# Patient Record
Sex: MALE | Race: WHITE | Employment: OTHER | URBAN - METROPOLITAN AREA
[De-identification: names, ages, dates, MRNs, and addresses within clinical notes are randomized per-mention and may not be internally consistent; named-entity substitution may affect disease eponyms.]

---

## 2017-01-03 RX ORDER — HYDROXYCHLOROQUINE SULFATE 200 MG/1
TABLET, FILM COATED ORAL
Qty: 60 TABLET | Refills: 5 | Status: SHIPPED | OUTPATIENT
Start: 2017-01-03 | End: 2017-07-13 | Stop reason: SDUPTHER

## 2017-01-25 ENCOUNTER — OFFICE VISIT (OUTPATIENT)
Dept: INTERNAL MEDICINE CLINIC | Age: 72
End: 2017-01-25

## 2017-01-25 VITALS
HEIGHT: 69 IN | BODY MASS INDEX: 42.8 KG/M2 | HEART RATE: 64 BPM | WEIGHT: 289 LBS | SYSTOLIC BLOOD PRESSURE: 138 MMHG | DIASTOLIC BLOOD PRESSURE: 80 MMHG

## 2017-01-25 DIAGNOSIS — I10 ESSENTIAL HYPERTENSION: ICD-10-CM

## 2017-01-25 DIAGNOSIS — E11.9 TYPE 2 DIABETES MELLITUS WITHOUT COMPLICATION, WITHOUT LONG-TERM CURRENT USE OF INSULIN (HCC): Primary | ICD-10-CM

## 2017-01-25 LAB — HBA1C MFR BLD: 6 %

## 2017-01-25 PROCEDURE — 99213 OFFICE O/P EST LOW 20 MIN: CPT | Performed by: INTERNAL MEDICINE

## 2017-01-25 PROCEDURE — 83036 HEMOGLOBIN GLYCOSYLATED A1C: CPT | Performed by: INTERNAL MEDICINE

## 2017-01-25 ASSESSMENT — PATIENT HEALTH QUESTIONNAIRE - PHQ9
SUM OF ALL RESPONSES TO PHQ QUESTIONS 1-9: 0
SUM OF ALL RESPONSES TO PHQ9 QUESTIONS 1 & 2: 0
1. LITTLE INTEREST OR PLEASURE IN DOING THINGS: 0
2. FEELING DOWN, DEPRESSED OR HOPELESS: 0

## 2017-01-25 ASSESSMENT — ENCOUNTER SYMPTOMS
WHEEZING: 0
SORE THROAT: 0
VOICE CHANGE: 0
GASTROINTESTINAL NEGATIVE: 1
COUGH: 0
CHEST TIGHTNESS: 0
SHORTNESS OF BREATH: 0

## 2017-03-07 ENCOUNTER — OFFICE VISIT (OUTPATIENT)
Dept: RHEUMATOLOGY | Age: 72
End: 2017-03-07

## 2017-03-07 VITALS
DIASTOLIC BLOOD PRESSURE: 82 MMHG | BODY MASS INDEX: 42.38 KG/M2 | HEART RATE: 67 BPM | WEIGHT: 287 LBS | TEMPERATURE: 98 F | SYSTOLIC BLOOD PRESSURE: 164 MMHG

## 2017-03-07 DIAGNOSIS — Z79.899 HIGH RISK MEDICATION USE: ICD-10-CM

## 2017-03-07 DIAGNOSIS — J84.9 ILD (INTERSTITIAL LUNG DISEASE) (HCC): ICD-10-CM

## 2017-03-07 DIAGNOSIS — M05.79 RHEUMATOID ARTHRITIS INVOLVING MULTIPLE SITES WITH POSITIVE RHEUMATOID FACTOR (HCC): Primary | ICD-10-CM

## 2017-03-07 DIAGNOSIS — M05.79 RHEUMATOID ARTHRITIS INVOLVING MULTIPLE SITES WITH POSITIVE RHEUMATOID FACTOR (HCC): ICD-10-CM

## 2017-03-07 DIAGNOSIS — R76.8 POSITIVE ANA (ANTINUCLEAR ANTIBODY): ICD-10-CM

## 2017-03-07 LAB
A/G RATIO: 1.2 (ref 1.1–2.2)
ALBUMIN SERPL-MCNC: 3.9 G/DL (ref 3.4–5)
ALP BLD-CCNC: 78 U/L (ref 40–129)
ALT SERPL-CCNC: 14 U/L (ref 10–40)
ANION GAP SERPL CALCULATED.3IONS-SCNC: 14 MMOL/L (ref 3–16)
AST SERPL-CCNC: 22 U/L (ref 15–37)
BASOPHILS ABSOLUTE: 0.1 K/UL (ref 0–0.2)
BASOPHILS RELATIVE PERCENT: 1.5 %
BILIRUB SERPL-MCNC: 0.5 MG/DL (ref 0–1)
BUN BLDV-MCNC: 20 MG/DL (ref 7–20)
C-REACTIVE PROTEIN: 6.7 MG/L (ref 0–5.1)
CALCIUM SERPL-MCNC: 10 MG/DL (ref 8.3–10.6)
CHLORIDE BLD-SCNC: 97 MMOL/L (ref 99–110)
CO2: 24 MMOL/L (ref 21–32)
CREAT SERPL-MCNC: 1.5 MG/DL (ref 0.8–1.3)
EOSINOPHILS ABSOLUTE: 0.2 K/UL (ref 0–0.6)
EOSINOPHILS RELATIVE PERCENT: 2.9 %
GFR AFRICAN AMERICAN: 56
GFR NON-AFRICAN AMERICAN: 46
GLOBULIN: 3.3 G/DL
GLUCOSE BLD-MCNC: 148 MG/DL (ref 70–99)
HCT VFR BLD CALC: 45.3 % (ref 40.5–52.5)
HEMOGLOBIN: 15.3 G/DL (ref 13.5–17.5)
LYMPHOCYTES ABSOLUTE: 1.8 K/UL (ref 1–5.1)
LYMPHOCYTES RELATIVE PERCENT: 24 %
MCH RBC QN AUTO: 32.6 PG (ref 26–34)
MCHC RBC AUTO-ENTMCNC: 33.9 G/DL (ref 31–36)
MCV RBC AUTO: 96.2 FL (ref 80–100)
MONOCYTES ABSOLUTE: 0.7 K/UL (ref 0–1.3)
MONOCYTES RELATIVE PERCENT: 10.2 %
NEUTROPHILS ABSOLUTE: 4.5 K/UL (ref 1.7–7.7)
NEUTROPHILS RELATIVE PERCENT: 61.4 %
PDW BLD-RTO: 13.2 % (ref 12.4–15.4)
PLATELET # BLD: 206 K/UL (ref 135–450)
PMV BLD AUTO: 9.9 FL (ref 5–10.5)
POTASSIUM SERPL-SCNC: 4.9 MMOL/L (ref 3.5–5.1)
RBC # BLD: 4.7 M/UL (ref 4.2–5.9)
SEDIMENTATION RATE, ERYTHROCYTE: 30 MM/HR (ref 0–20)
SODIUM BLD-SCNC: 135 MMOL/L (ref 136–145)
TOTAL PROTEIN: 7.2 G/DL (ref 6.4–8.2)
WBC # BLD: 7.3 K/UL (ref 4–11)

## 2017-03-07 PROCEDURE — 99214 OFFICE O/P EST MOD 30 MIN: CPT | Performed by: INTERNAL MEDICINE

## 2017-04-13 RX ORDER — VALSARTAN 160 MG/1
160 TABLET ORAL DAILY
Qty: 90 TABLET | Refills: 1 | Status: SHIPPED | OUTPATIENT
Start: 2017-04-13 | End: 2017-04-17 | Stop reason: SDUPTHER

## 2017-04-17 RX ORDER — VALSARTAN 160 MG/1
160 TABLET ORAL DAILY
Qty: 90 TABLET | Refills: 1 | Status: ON HOLD | OUTPATIENT
Start: 2017-04-17 | End: 2020-01-01

## 2017-04-28 RX ORDER — FENOFIBRATE 145 MG/1
TABLET, COATED ORAL
Qty: 90 TABLET | Refills: 3 | Status: ON HOLD | OUTPATIENT
Start: 2017-04-28 | End: 2020-01-01

## 2017-04-28 RX ORDER — ACEBUTOLOL HYDROCHLORIDE 400 MG/1
400 CAPSULE ORAL 2 TIMES DAILY
Qty: 180 CAPSULE | Refills: 3 | Status: ON HOLD | OUTPATIENT
Start: 2017-04-28 | End: 2020-01-01

## 2017-05-03 ENCOUNTER — OFFICE VISIT (OUTPATIENT)
Dept: INTERNAL MEDICINE CLINIC | Age: 72
End: 2017-05-03

## 2017-05-03 VITALS
OXYGEN SATURATION: 98 % | DIASTOLIC BLOOD PRESSURE: 90 MMHG | HEART RATE: 58 BPM | RESPIRATION RATE: 16 BRPM | BODY MASS INDEX: 42.69 KG/M2 | TEMPERATURE: 98.1 F | WEIGHT: 288.2 LBS | SYSTOLIC BLOOD PRESSURE: 160 MMHG | HEIGHT: 69 IN

## 2017-05-03 DIAGNOSIS — E11.00 TYPE 2 DIABETES MELLITUS WITH HYPEROSMOLARITY WITHOUT COMA, UNSPECIFIED LONG TERM INSULIN USE STATUS: Primary | ICD-10-CM

## 2017-05-03 DIAGNOSIS — I10 ESSENTIAL HYPERTENSION: ICD-10-CM

## 2017-05-03 DIAGNOSIS — E78.5 HYPERLIPIDEMIA, UNSPECIFIED HYPERLIPIDEMIA TYPE: ICD-10-CM

## 2017-05-03 DIAGNOSIS — Z23 NEED FOR VACCINATION WITH 13-POLYVALENT PNEUMOCOCCAL CONJUGATE VACCINE: ICD-10-CM

## 2017-05-03 LAB
CHOLESTEROL, TOTAL: 138 MG/DL (ref 0–199)
HBA1C MFR BLD: 5.9 %
HDLC SERPL-MCNC: 51 MG/DL (ref 40–60)
LDL CHOLESTEROL CALCULATED: 70 MG/DL
TRIGL SERPL-MCNC: 85 MG/DL (ref 0–150)
VLDLC SERPL CALC-MCNC: 17 MG/DL

## 2017-05-03 PROCEDURE — 90670 PCV13 VACCINE IM: CPT | Performed by: INTERNAL MEDICINE

## 2017-05-03 PROCEDURE — 90471 IMMUNIZATION ADMIN: CPT | Performed by: INTERNAL MEDICINE

## 2017-05-03 PROCEDURE — 36415 COLL VENOUS BLD VENIPUNCTURE: CPT | Performed by: INTERNAL MEDICINE

## 2017-05-03 PROCEDURE — 99213 OFFICE O/P EST LOW 20 MIN: CPT | Performed by: INTERNAL MEDICINE

## 2017-05-03 PROCEDURE — 83036 HEMOGLOBIN GLYCOSYLATED A1C: CPT | Performed by: INTERNAL MEDICINE

## 2017-05-03 ASSESSMENT — ENCOUNTER SYMPTOMS
COUGH: 0
GASTROINTESTINAL NEGATIVE: 1
SORE THROAT: 0
TROUBLE SWALLOWING: 0
WHEEZING: 0
SHORTNESS OF BREATH: 1
CHEST TIGHTNESS: 0

## 2017-05-25 RX ORDER — GLYBURIDE 5 MG/1
TABLET ORAL
Qty: 180 TABLET | Refills: 1 | Status: ON HOLD | OUTPATIENT
Start: 2017-05-25 | End: 2020-01-01

## 2017-06-08 ENCOUNTER — TELEPHONE (OUTPATIENT)
Dept: RHEUMATOLOGY | Age: 72
End: 2017-06-08

## 2017-06-09 ENCOUNTER — TELEPHONE (OUTPATIENT)
Dept: RHEUMATOLOGY | Age: 72
End: 2017-06-09

## 2017-07-13 RX ORDER — HYDROXYCHLOROQUINE SULFATE 200 MG/1
TABLET, FILM COATED ORAL
Qty: 60 TABLET | Refills: 5 | Status: SHIPPED | OUTPATIENT
Start: 2017-07-13

## 2020-01-01 ENCOUNTER — APPOINTMENT (OUTPATIENT)
Dept: GENERAL RADIOLOGY | Age: 75
DRG: 177 | End: 2020-01-01
Payer: COMMERCIAL

## 2020-01-01 ENCOUNTER — HOSPITAL ENCOUNTER (INPATIENT)
Age: 75
LOS: 3 days | DRG: 177 | End: 2020-10-21
Attending: EMERGENCY MEDICINE | Admitting: INTERNAL MEDICINE
Payer: COMMERCIAL

## 2020-01-01 ENCOUNTER — APPOINTMENT (OUTPATIENT)
Dept: CT IMAGING | Age: 75
DRG: 177 | End: 2020-01-01
Payer: COMMERCIAL

## 2020-01-01 VITALS
HEART RATE: 103 BPM | OXYGEN SATURATION: 78 % | BODY MASS INDEX: 37.99 KG/M2 | TEMPERATURE: 96.5 F | SYSTOLIC BLOOD PRESSURE: 102 MMHG | RESPIRATION RATE: 36 BRPM | HEIGHT: 68 IN | WEIGHT: 250.66 LBS | DIASTOLIC BLOOD PRESSURE: 79 MMHG

## 2020-01-01 LAB
A/G RATIO: 0.9 (ref 1.1–2.2)
A/G RATIO: 0.9 (ref 1.1–2.2)
A/G RATIO: 1.1 (ref 1.1–2.2)
ABO/RH: NORMAL
ALBUMIN SERPL-MCNC: 2.3 G/DL (ref 3.4–5)
ALBUMIN SERPL-MCNC: 2.8 G/DL (ref 3.4–5)
ALBUMIN SERPL-MCNC: 2.9 G/DL (ref 3.4–5)
ALP BLD-CCNC: 127 U/L (ref 40–129)
ALP BLD-CCNC: 57 U/L (ref 40–129)
ALP BLD-CCNC: 63 U/L (ref 40–129)
ALT SERPL-CCNC: 14 U/L (ref 10–40)
ALT SERPL-CCNC: 15 U/L (ref 10–40)
ALT SERPL-CCNC: 460 U/L (ref 10–40)
ANION GAP SERPL CALCULATED.3IONS-SCNC: 12 MMOL/L (ref 3–16)
ANION GAP SERPL CALCULATED.3IONS-SCNC: 15 MMOL/L (ref 3–16)
ANION GAP SERPL CALCULATED.3IONS-SCNC: 16 MMOL/L (ref 3–16)
ANTIBODY SCREEN: NORMAL
AST SERPL-CCNC: 25 U/L (ref 15–37)
AST SERPL-CCNC: 29 U/L (ref 15–37)
AST SERPL-CCNC: 863 U/L (ref 15–37)
BASE EXCESS ARTERIAL: 3.8 MMOL/L (ref -3–3)
BASOPHILS ABSOLUTE: 0 K/UL (ref 0–0.2)
BASOPHILS ABSOLUTE: 0 K/UL (ref 0–0.2)
BASOPHILS RELATIVE PERCENT: 0 %
BASOPHILS RELATIVE PERCENT: 0.2 %
BILIRUB SERPL-MCNC: 0.4 MG/DL (ref 0–1)
BILIRUB SERPL-MCNC: 0.4 MG/DL (ref 0–1)
BILIRUB SERPL-MCNC: 0.7 MG/DL (ref 0–1)
BLOOD BANK DISPENSE STATUS: NORMAL
BLOOD BANK DISPENSE STATUS: NORMAL
BLOOD BANK PRODUCT CODE: NORMAL
BLOOD BANK PRODUCT CODE: NORMAL
BPU ID: NORMAL
BPU ID: NORMAL
BUN BLDV-MCNC: 18 MG/DL (ref 7–20)
BUN BLDV-MCNC: 21 MG/DL (ref 7–20)
BUN BLDV-MCNC: 37 MG/DL (ref 7–20)
CALCIUM SERPL-MCNC: 7.9 MG/DL (ref 8.3–10.6)
CALCIUM SERPL-MCNC: 8.4 MG/DL (ref 8.3–10.6)
CALCIUM SERPL-MCNC: 8.7 MG/DL (ref 8.3–10.6)
CARBOXYHEMOGLOBIN ARTERIAL: 1.2 % (ref 0–1.5)
CHLORIDE BLD-SCNC: 92 MMOL/L (ref 99–110)
CHLORIDE BLD-SCNC: 95 MMOL/L (ref 99–110)
CHLORIDE BLD-SCNC: 96 MMOL/L (ref 99–110)
CO2: 23 MMOL/L (ref 21–32)
CO2: 24 MMOL/L (ref 21–32)
CO2: 27 MMOL/L (ref 21–32)
CREAT SERPL-MCNC: 0.9 MG/DL (ref 0.8–1.3)
CREAT SERPL-MCNC: 1 MG/DL (ref 0.8–1.3)
CREAT SERPL-MCNC: 1.3 MG/DL (ref 0.8–1.3)
D DIMER: 1944 NG/ML DDU (ref 0–229)
DESCRIPTION BLOOD BANK: NORMAL
DESCRIPTION BLOOD BANK: NORMAL
EKG ATRIAL RATE: 97 BPM
EKG DIAGNOSIS: NORMAL
EKG P AXIS: 40 DEGREES
EKG P-R INTERVAL: 166 MS
EKG Q-T INTERVAL: 338 MS
EKG QRS DURATION: 98 MS
EKG QTC CALCULATION (BAZETT): 429 MS
EKG R AXIS: 45 DEGREES
EKG T AXIS: 104 DEGREES
EKG VENTRICULAR RATE: 97 BPM
EOSINOPHILS ABSOLUTE: 0 K/UL (ref 0–0.6)
EOSINOPHILS ABSOLUTE: 0 K/UL (ref 0–0.6)
EOSINOPHILS RELATIVE PERCENT: 0 %
EOSINOPHILS RELATIVE PERCENT: 0 %
ESTIMATED AVERAGE GLUCOSE: 174.3 MG/DL
GFR AFRICAN AMERICAN: >60
GFR NON-AFRICAN AMERICAN: 54
GFR NON-AFRICAN AMERICAN: >60
GFR NON-AFRICAN AMERICAN: >60
GLOBULIN: 2.6 G/DL
GLOBULIN: 2.7 G/DL
GLOBULIN: 3 G/DL
GLUCOSE BLD-MCNC: 117 MG/DL (ref 70–99)
GLUCOSE BLD-MCNC: 144 MG/DL (ref 70–99)
GLUCOSE BLD-MCNC: 153 MG/DL (ref 70–99)
GLUCOSE BLD-MCNC: 164 MG/DL (ref 70–99)
GLUCOSE BLD-MCNC: 183 MG/DL (ref 70–99)
GLUCOSE BLD-MCNC: 186 MG/DL (ref 70–99)
GLUCOSE BLD-MCNC: 188 MG/DL (ref 70–99)
GLUCOSE BLD-MCNC: 199 MG/DL (ref 70–99)
GLUCOSE BLD-MCNC: 203 MG/DL (ref 70–99)
GLUCOSE BLD-MCNC: 218 MG/DL (ref 70–99)
GLUCOSE BLD-MCNC: 225 MG/DL (ref 70–99)
GLUCOSE BLD-MCNC: 229 MG/DL (ref 70–99)
GLUCOSE BLD-MCNC: 244 MG/DL (ref 70–99)
GLUCOSE BLD-MCNC: 261 MG/DL (ref 70–99)
GLUCOSE BLD-MCNC: 278 MG/DL (ref 70–99)
GLUCOSE BLD-MCNC: 79 MG/DL (ref 70–99)
HBA1C MFR BLD: 7.7 %
HCO3 ARTERIAL: 27.8 MMOL/L (ref 21–29)
HCT VFR BLD CALC: 36.6 % (ref 40.5–52.5)
HCT VFR BLD CALC: 37.8 % (ref 40.5–52.5)
HCT VFR BLD CALC: 39.4 % (ref 40.5–52.5)
HEMOGLOBIN, ART, EXTENDED: 12.9 G/DL (ref 13.5–17.5)
HEMOGLOBIN: 11.9 G/DL (ref 13.5–17.5)
HEMOGLOBIN: 12.7 G/DL (ref 13.5–17.5)
HEMOGLOBIN: 12.9 G/DL (ref 13.5–17.5)
LACTIC ACID: 1.7 MMOL/L (ref 0.4–2)
LACTIC ACID: 1.8 MMOL/L (ref 0.4–2)
LACTIC ACID: 2.1 MMOL/L (ref 0.4–2)
LACTIC ACID: 2.4 MMOL/L (ref 0.4–2)
LACTIC ACID: 3 MMOL/L (ref 0.4–2)
LACTIC ACID: 3.2 MMOL/L (ref 0.4–2)
LACTIC ACID: 4 MMOL/L (ref 0.4–2)
LACTIC ACID: 4.4 MMOL/L (ref 0.4–2)
LACTIC ACID: 6.1 MMOL/L (ref 0.4–2)
LYMPHOCYTES ABSOLUTE: 0.2 K/UL (ref 1–5.1)
LYMPHOCYTES ABSOLUTE: 0.2 K/UL (ref 1–5.1)
LYMPHOCYTES RELATIVE PERCENT: 2 %
LYMPHOCYTES RELATIVE PERCENT: 3.5 %
MAGNESIUM: 1.4 MG/DL (ref 1.8–2.4)
MAGNESIUM: 1.9 MG/DL (ref 1.8–2.4)
MCH RBC QN AUTO: 30.1 PG (ref 26–34)
MCH RBC QN AUTO: 30.2 PG (ref 26–34)
MCH RBC QN AUTO: 30.6 PG (ref 26–34)
MCHC RBC AUTO-ENTMCNC: 32.6 G/DL (ref 31–36)
MCHC RBC AUTO-ENTMCNC: 32.7 G/DL (ref 31–36)
MCHC RBC AUTO-ENTMCNC: 33.5 G/DL (ref 31–36)
MCV RBC AUTO: 91.4 FL (ref 80–100)
MCV RBC AUTO: 92 FL (ref 80–100)
MCV RBC AUTO: 92.7 FL (ref 80–100)
METHEMOGLOBIN ARTERIAL: 0.1 %
MONOCYTES ABSOLUTE: 0.6 K/UL (ref 0–1.3)
MONOCYTES ABSOLUTE: 0.6 K/UL (ref 0–1.3)
MONOCYTES RELATIVE PERCENT: 5.6 %
MONOCYTES RELATIVE PERCENT: 8 %
NEUTROPHILS ABSOLUTE: 6.2 K/UL (ref 1.7–7.7)
NEUTROPHILS ABSOLUTE: 9.3 K/UL (ref 1.7–7.7)
NEUTROPHILS RELATIVE PERCENT: 88.3 %
NEUTROPHILS RELATIVE PERCENT: 92.4 %
O2 CONTENT ARTERIAL: 15 ML/DL
O2 SAT, ARTERIAL: 84.9 %
O2 THERAPY: ABNORMAL
PCO2 ARTERIAL: 38.9 MMHG (ref 35–45)
PDW BLD-RTO: 14.6 % (ref 12.4–15.4)
PDW BLD-RTO: 14.6 % (ref 12.4–15.4)
PDW BLD-RTO: 14.8 % (ref 12.4–15.4)
PERFORMED ON: ABNORMAL
PH ARTERIAL: 7.46 (ref 7.35–7.45)
PHOSPHORUS: 3.8 MG/DL (ref 2.5–4.9)
PLATELET # BLD: 158 K/UL (ref 135–450)
PLATELET # BLD: 196 K/UL (ref 135–450)
PLATELET # BLD: 207 K/UL (ref 135–450)
PMV BLD AUTO: 8.6 FL (ref 5–10.5)
PMV BLD AUTO: 9 FL (ref 5–10.5)
PMV BLD AUTO: 9.4 FL (ref 5–10.5)
PO2 ARTERIAL: 48.3 MMHG (ref 75–108)
POTASSIUM REFLEX MAGNESIUM: 4.1 MMOL/L (ref 3.5–5.1)
POTASSIUM REFLEX MAGNESIUM: 4.2 MMOL/L (ref 3.5–5.1)
POTASSIUM SERPL-SCNC: 3.9 MMOL/L (ref 3.5–5.1)
PROCALCITONIN: 0.13 NG/ML (ref 0–0.15)
PROCALCITONIN: 0.17 NG/ML (ref 0–0.15)
RBC # BLD: 3.94 M/UL (ref 4.2–5.9)
RBC # BLD: 4.14 M/UL (ref 4.2–5.9)
RBC # BLD: 4.29 M/UL (ref 4.2–5.9)
SARS-COV-2, NAAT: DETECTED
SODIUM BLD-SCNC: 132 MMOL/L (ref 136–145)
SODIUM BLD-SCNC: 133 MMOL/L (ref 136–145)
SODIUM BLD-SCNC: 135 MMOL/L (ref 136–145)
TCO2 ARTERIAL: 29 MMOL/L
TOTAL PROTEIN: 5 G/DL (ref 6.4–8.2)
TOTAL PROTEIN: 5.5 G/DL (ref 6.4–8.2)
TOTAL PROTEIN: 5.8 G/DL (ref 6.4–8.2)
TROPONIN: 0.02 NG/ML
WBC # BLD: 10.1 K/UL (ref 4–11)
WBC # BLD: 2.9 K/UL (ref 4–11)
WBC # BLD: 7 K/UL (ref 4–11)

## 2020-01-01 PROCEDURE — 2580000003 HC RX 258: Performed by: INTERNAL MEDICINE

## 2020-01-01 PROCEDURE — 6360000002 HC RX W HCPCS: Performed by: INTERNAL MEDICINE

## 2020-01-01 PROCEDURE — 80053 COMPREHEN METABOLIC PANEL: CPT

## 2020-01-01 PROCEDURE — 94640 AIRWAY INHALATION TREATMENT: CPT

## 2020-01-01 PROCEDURE — 6370000000 HC RX 637 (ALT 250 FOR IP): Performed by: INTERNAL MEDICINE

## 2020-01-01 PROCEDURE — 36600 WITHDRAWAL OF ARTERIAL BLOOD: CPT

## 2020-01-01 PROCEDURE — 99291 CRITICAL CARE FIRST HOUR: CPT | Performed by: INTERNAL MEDICINE

## 2020-01-01 PROCEDURE — 83735 ASSAY OF MAGNESIUM: CPT

## 2020-01-01 PROCEDURE — 2500000003 HC RX 250 WO HCPCS: Performed by: INTERNAL MEDICINE

## 2020-01-01 PROCEDURE — 36415 COLL VENOUS BLD VENIPUNCTURE: CPT

## 2020-01-01 PROCEDURE — 85379 FIBRIN DEGRADATION QUANT: CPT

## 2020-01-01 PROCEDURE — 71045 X-RAY EXAM CHEST 1 VIEW: CPT

## 2020-01-01 PROCEDURE — 86850 RBC ANTIBODY SCREEN: CPT

## 2020-01-01 PROCEDURE — 84145 PROCALCITONIN (PCT): CPT

## 2020-01-01 PROCEDURE — 6370000000 HC RX 637 (ALT 250 FOR IP): Performed by: NURSE PRACTITIONER

## 2020-01-01 PROCEDURE — 85025 COMPLETE CBC W/AUTO DIFF WBC: CPT

## 2020-01-01 PROCEDURE — 84100 ASSAY OF PHOSPHORUS: CPT

## 2020-01-01 PROCEDURE — 94660 CPAP INITIATION&MGMT: CPT

## 2020-01-01 PROCEDURE — 6360000002 HC RX W HCPCS: Performed by: NURSE PRACTITIONER

## 2020-01-01 PROCEDURE — 83605 ASSAY OF LACTIC ACID: CPT

## 2020-01-01 PROCEDURE — 86900 BLOOD TYPING SEROLOGIC ABO: CPT

## 2020-01-01 PROCEDURE — 83036 HEMOGLOBIN GLYCOSYLATED A1C: CPT

## 2020-01-01 PROCEDURE — 87040 BLOOD CULTURE FOR BACTERIA: CPT

## 2020-01-01 PROCEDURE — 85027 COMPLETE CBC AUTOMATED: CPT

## 2020-01-01 PROCEDURE — 84484 ASSAY OF TROPONIN QUANT: CPT

## 2020-01-01 PROCEDURE — 86901 BLOOD TYPING SEROLOGIC RH(D): CPT

## 2020-01-01 PROCEDURE — APPNB15 APP NON BILLABLE TIME 0-15 MINS: Performed by: NURSE PRACTITIONER

## 2020-01-01 PROCEDURE — 94761 N-INVAS EAR/PLS OXIMETRY MLT: CPT

## 2020-01-01 PROCEDURE — U0002 COVID-19 LAB TEST NON-CDC: HCPCS

## 2020-01-01 PROCEDURE — 2580000003 HC RX 258: Performed by: EMERGENCY MEDICINE

## 2020-01-01 PROCEDURE — XW033E5 INTRODUCTION OF REMDESIVIR ANTI-INFECTIVE INTO PERIPHERAL VEIN, PERCUTANEOUS APPROACH, NEW TECHNOLOGY GROUP 5: ICD-10-PCS | Performed by: INTERNAL MEDICINE

## 2020-01-01 PROCEDURE — 2700000000 HC OXYGEN THERAPY PER DAY

## 2020-01-01 PROCEDURE — 2000000000 HC ICU R&B

## 2020-01-01 PROCEDURE — 6360000002 HC RX W HCPCS: Performed by: EMERGENCY MEDICINE

## 2020-01-01 PROCEDURE — 93010 ELECTROCARDIOGRAM REPORT: CPT | Performed by: INTERNAL MEDICINE

## 2020-01-01 PROCEDURE — 2580000003 HC RX 258: Performed by: NURSE PRACTITIONER

## 2020-01-01 PROCEDURE — XW13325 TRANSFUSION OF CONVALESCENT PLASMA (NONAUTOLOGOUS) INTO PERIPHERAL VEIN, PERCUTANEOUS APPROACH, NEW TECHNOLOGY GROUP 5: ICD-10-PCS | Performed by: INTERNAL MEDICINE

## 2020-01-01 PROCEDURE — 99285 EMERGENCY DEPT VISIT HI MDM: CPT

## 2020-01-01 PROCEDURE — 82803 BLOOD GASES ANY COMBINATION: CPT

## 2020-01-01 PROCEDURE — 93005 ELECTROCARDIOGRAM TRACING: CPT | Performed by: EMERGENCY MEDICINE

## 2020-01-01 RX ORDER — ATORVASTATIN CALCIUM 40 MG/1
40 TABLET, FILM COATED ORAL NIGHTLY
COMMUNITY

## 2020-01-01 RX ORDER — MAGNESIUM SULFATE IN WATER 40 MG/ML
2 INJECTION, SOLUTION INTRAVENOUS ONCE
Status: COMPLETED | OUTPATIENT
Start: 2020-01-01 | End: 2020-01-01

## 2020-01-01 RX ORDER — METOPROLOL SUCCINATE 100 MG/1
100 TABLET, EXTENDED RELEASE ORAL DAILY
COMMUNITY

## 2020-01-01 RX ORDER — MYCOPHENOLATE MOFETIL 250 MG/1
1000 CAPSULE ORAL 2 TIMES DAILY
COMMUNITY

## 2020-01-01 RX ORDER — SODIUM CHLORIDE 0.9 % (FLUSH) 0.9 %
10 SYRINGE (ML) INJECTION EVERY 12 HOURS SCHEDULED
Status: DISCONTINUED | OUTPATIENT
Start: 2020-01-01 | End: 2020-01-01 | Stop reason: SDUPTHER

## 2020-01-01 RX ORDER — DEXTROSE MONOHYDRATE 50 MG/ML
100 INJECTION, SOLUTION INTRAVENOUS PRN
Status: DISCONTINUED | OUTPATIENT
Start: 2020-01-01 | End: 2020-01-01 | Stop reason: HOSPADM

## 2020-01-01 RX ORDER — VALSARTAN 80 MG/1
160 TABLET ORAL DAILY
Status: DISCONTINUED | OUTPATIENT
Start: 2020-01-01 | End: 2020-01-01 | Stop reason: ALTCHOICE

## 2020-01-01 RX ORDER — MAGNESIUM SULFATE IN WATER 40 MG/ML
2 INJECTION, SOLUTION INTRAVENOUS PRN
Status: DISCONTINUED | OUTPATIENT
Start: 2020-01-01 | End: 2020-01-01 | Stop reason: HOSPADM

## 2020-01-01 RX ORDER — MORPHINE SULFATE 2 MG/ML
2 INJECTION, SOLUTION INTRAMUSCULAR; INTRAVENOUS EVERY 4 HOURS PRN
Status: DISCONTINUED | OUTPATIENT
Start: 2020-01-01 | End: 2020-01-01 | Stop reason: HOSPADM

## 2020-01-01 RX ORDER — LISINOPRIL 10 MG/1
10 TABLET ORAL DAILY
COMMUNITY

## 2020-01-01 RX ORDER — SODIUM CHLORIDE 0.9 % (FLUSH) 0.9 %
10 SYRINGE (ML) INJECTION PRN
Status: DISCONTINUED | OUTPATIENT
Start: 2020-01-01 | End: 2020-01-01

## 2020-01-01 RX ORDER — AZITHROMYCIN 500 MG/1
500 TABLET, FILM COATED ORAL ONCE
Status: DISCONTINUED | OUTPATIENT
Start: 2020-01-01 | End: 2020-01-01

## 2020-01-01 RX ORDER — MYCOPHENOLATE MOFETIL 250 MG/1
1000 CAPSULE ORAL 2 TIMES DAILY
Status: DISCONTINUED | OUTPATIENT
Start: 2020-01-01 | End: 2020-01-01 | Stop reason: HOSPADM

## 2020-01-01 RX ORDER — SODIUM CHLORIDE 0.9 % (FLUSH) 0.9 %
10 SYRINGE (ML) INJECTION EVERY 12 HOURS SCHEDULED
Status: DISCONTINUED | OUTPATIENT
Start: 2020-01-01 | End: 2020-01-01

## 2020-01-01 RX ORDER — LISINOPRIL 10 MG/1
10 TABLET ORAL DAILY
Status: DISCONTINUED | OUTPATIENT
Start: 2020-01-01 | End: 2020-01-01 | Stop reason: HOSPADM

## 2020-01-01 RX ORDER — POLYETHYLENE GLYCOL 3350 17 G/17G
17 POWDER, FOR SOLUTION ORAL DAILY PRN
Status: DISCONTINUED | OUTPATIENT
Start: 2020-01-01 | End: 2020-01-01 | Stop reason: HOSPADM

## 2020-01-01 RX ORDER — ATORVASTATIN CALCIUM 40 MG/1
40 TABLET, FILM COATED ORAL NIGHTLY
Status: DISCONTINUED | OUTPATIENT
Start: 2020-01-01 | End: 2020-01-01 | Stop reason: HOSPADM

## 2020-01-01 RX ORDER — GLIPIZIDE 5 MG/1
5 TABLET ORAL
COMMUNITY

## 2020-01-01 RX ORDER — 0.9 % SODIUM CHLORIDE 0.9 %
1000 INTRAVENOUS SOLUTION INTRAVENOUS ONCE
Status: COMPLETED | OUTPATIENT
Start: 2020-01-01 | End: 2020-01-01

## 2020-01-01 RX ORDER — 0.9 % SODIUM CHLORIDE 0.9 %
20 INTRAVENOUS SOLUTION INTRAVENOUS ONCE
Status: COMPLETED | OUTPATIENT
Start: 2020-01-01 | End: 2020-01-01

## 2020-01-01 RX ORDER — FUROSEMIDE 10 MG/ML
20 INJECTION INTRAMUSCULAR; INTRAVENOUS ONCE
Status: COMPLETED | OUTPATIENT
Start: 2020-01-01 | End: 2020-01-01

## 2020-01-01 RX ORDER — DEXAMETHASONE SODIUM PHOSPHATE 4 MG/ML
10 INJECTION, SOLUTION INTRA-ARTICULAR; INTRALESIONAL; INTRAMUSCULAR; INTRAVENOUS; SOFT TISSUE ONCE
Status: DISCONTINUED | OUTPATIENT
Start: 2020-01-01 | End: 2020-01-01

## 2020-01-01 RX ORDER — FUROSEMIDE 40 MG/1
40 TABLET ORAL EVERY MORNING
COMMUNITY

## 2020-01-01 RX ORDER — PREDNISONE 10 MG/1
15 TABLET ORAL DAILY
COMMUNITY

## 2020-01-01 RX ORDER — DEXAMETHASONE SODIUM PHOSPHATE 10 MG/ML
6 INJECTION, SOLUTION INTRAMUSCULAR; INTRAVENOUS EVERY 24 HOURS
Status: DISCONTINUED | OUTPATIENT
Start: 2020-01-01 | End: 2020-01-01

## 2020-01-01 RX ORDER — ALBUTEROL SULFATE 2.5 MG/3ML
2.5 SOLUTION RESPIRATORY (INHALATION) EVERY 4 HOURS
Status: DISCONTINUED | OUTPATIENT
Start: 2020-01-01 | End: 2020-01-01 | Stop reason: CLARIF

## 2020-01-01 RX ORDER — SODIUM CHLORIDE 0.9 % (FLUSH) 0.9 %
10 SYRINGE (ML) INJECTION PRN
Status: DISCONTINUED | OUTPATIENT
Start: 2020-01-01 | End: 2020-01-01 | Stop reason: SDUPTHER

## 2020-01-01 RX ORDER — SODIUM CHLORIDE 9 MG/ML
INJECTION, SOLUTION INTRAVENOUS CONTINUOUS
Status: DISCONTINUED | OUTPATIENT
Start: 2020-01-01 | End: 2020-01-01 | Stop reason: HOSPADM

## 2020-01-01 RX ORDER — FENOFIBRATE 54 MG/1
54 TABLET ORAL DAILY
Status: DISCONTINUED | OUTPATIENT
Start: 2020-01-01 | End: 2020-01-01 | Stop reason: ALTCHOICE

## 2020-01-01 RX ORDER — ONDANSETRON 2 MG/ML
4 INJECTION INTRAMUSCULAR; INTRAVENOUS EVERY 6 HOURS PRN
Status: DISCONTINUED | OUTPATIENT
Start: 2020-01-01 | End: 2020-01-01 | Stop reason: HOSPADM

## 2020-01-01 RX ORDER — PROMETHAZINE HYDROCHLORIDE 25 MG/1
12.5 TABLET ORAL EVERY 6 HOURS PRN
Status: DISCONTINUED | OUTPATIENT
Start: 2020-01-01 | End: 2020-01-01 | Stop reason: HOSPADM

## 2020-01-01 RX ORDER — ALBUTEROL SULFATE 90 UG/1
2 AEROSOL, METERED RESPIRATORY (INHALATION) 4 TIMES DAILY
Status: DISCONTINUED | OUTPATIENT
Start: 2020-01-01 | End: 2020-01-01 | Stop reason: HOSPADM

## 2020-01-01 RX ORDER — DEXAMETHASONE SODIUM PHOSPHATE 4 MG/ML
10 INJECTION, SOLUTION INTRA-ARTICULAR; INTRALESIONAL; INTRAMUSCULAR; INTRAVENOUS; SOFT TISSUE ONCE
Status: COMPLETED | OUTPATIENT
Start: 2020-01-01 | End: 2020-01-01

## 2020-01-01 RX ORDER — POTASSIUM CHLORIDE 7.45 MG/ML
10 INJECTION INTRAVENOUS PRN
Status: DISCONTINUED | OUTPATIENT
Start: 2020-01-01 | End: 2020-01-01 | Stop reason: HOSPADM

## 2020-01-01 RX ORDER — FUROSEMIDE 20 MG/1
20 TABLET ORAL EVERY EVENING
COMMUNITY

## 2020-01-01 RX ORDER — DEXTROSE MONOHYDRATE 25 G/50ML
12.5 INJECTION, SOLUTION INTRAVENOUS PRN
Status: DISCONTINUED | OUTPATIENT
Start: 2020-01-01 | End: 2020-01-01 | Stop reason: HOSPADM

## 2020-01-01 RX ORDER — LORAZEPAM 0.5 MG/1
0.5 TABLET ORAL EVERY 6 HOURS PRN
Status: DISCONTINUED | OUTPATIENT
Start: 2020-01-01 | End: 2020-01-01 | Stop reason: HOSPADM

## 2020-01-01 RX ORDER — ACETAMINOPHEN 650 MG/1
650 SUPPOSITORY RECTAL EVERY 6 HOURS PRN
Status: DISCONTINUED | OUTPATIENT
Start: 2020-01-01 | End: 2020-01-01 | Stop reason: HOSPADM

## 2020-01-01 RX ORDER — NICOTINE POLACRILEX 4 MG
15 LOZENGE BUCCAL PRN
Status: DISCONTINUED | OUTPATIENT
Start: 2020-01-01 | End: 2020-01-01 | Stop reason: HOSPADM

## 2020-01-01 RX ORDER — DEXAMETHASONE SODIUM PHOSPHATE 4 MG/ML
6 INJECTION, SOLUTION INTRA-ARTICULAR; INTRALESIONAL; INTRAMUSCULAR; INTRAVENOUS; SOFT TISSUE DAILY
Status: DISCONTINUED | OUTPATIENT
Start: 2020-01-01 | End: 2020-01-01

## 2020-01-01 RX ORDER — LIDOCAINE HYDROCHLORIDE 10 MG/ML
5 INJECTION, SOLUTION EPIDURAL; INFILTRATION; INTRACAUDAL; PERINEURAL ONCE
Status: DISCONTINUED | OUTPATIENT
Start: 2020-01-01 | End: 2020-01-01

## 2020-01-01 RX ORDER — ACETAMINOPHEN 325 MG/1
650 TABLET ORAL EVERY 6 HOURS PRN
Status: DISCONTINUED | OUTPATIENT
Start: 2020-01-01 | End: 2020-01-01 | Stop reason: HOSPADM

## 2020-01-01 RX ORDER — HYDROXYCHLOROQUINE SULFATE 200 MG/1
200 TABLET, FILM COATED ORAL 2 TIMES DAILY
Status: DISCONTINUED | OUTPATIENT
Start: 2020-01-01 | End: 2020-01-01 | Stop reason: HOSPADM

## 2020-01-01 RX ADMIN — REMDESIVIR 200 MG: 100 INJECTION, POWDER, LYOPHILIZED, FOR SOLUTION INTRAVENOUS at 14:10

## 2020-01-01 RX ADMIN — MORPHINE SULFATE 2 MG: 2 INJECTION, SOLUTION INTRAMUSCULAR; INTRAVENOUS at 02:41

## 2020-01-01 RX ADMIN — MORPHINE SULFATE 2 MG: 2 INJECTION, SOLUTION INTRAMUSCULAR; INTRAVENOUS at 06:06

## 2020-01-01 RX ADMIN — ALBUTEROL SULFATE 2 PUFF: 108 AEROSOL, METERED RESPIRATORY (INHALATION) at 07:35

## 2020-01-01 RX ADMIN — ONDANSETRON 4 MG: 2 INJECTION INTRAMUSCULAR; INTRAVENOUS at 20:02

## 2020-01-01 RX ADMIN — ALBUTEROL SULFATE 2 PUFF: 108 AEROSOL, METERED RESPIRATORY (INHALATION) at 15:42

## 2020-01-01 RX ADMIN — INSULIN LISPRO 6 UNITS: 100 INJECTION, SOLUTION INTRAVENOUS; SUBCUTANEOUS at 23:17

## 2020-01-01 RX ADMIN — MORPHINE SULFATE 2 MG: 2 INJECTION, SOLUTION INTRAMUSCULAR; INTRAVENOUS at 17:47

## 2020-01-01 RX ADMIN — SODIUM CHLORIDE 1000 ML: 9 INJECTION, SOLUTION INTRAVENOUS at 16:46

## 2020-01-01 RX ADMIN — Medication 6 MG: at 21:19

## 2020-01-01 RX ADMIN — ALBUTEROL SULFATE 2 PUFF: 108 AEROSOL, METERED RESPIRATORY (INHALATION) at 11:48

## 2020-01-01 RX ADMIN — ALBUTEROL SULFATE 2 PUFF: 108 AEROSOL, METERED RESPIRATORY (INHALATION) at 19:32

## 2020-01-01 RX ADMIN — MORPHINE SULFATE 2 MG: 2 INJECTION, SOLUTION INTRAMUSCULAR; INTRAVENOUS at 10:48

## 2020-01-01 RX ADMIN — ATORVASTATIN CALCIUM 40 MG: 40 TABLET, FILM COATED ORAL at 21:48

## 2020-01-01 RX ADMIN — MYCOPHENOLATE MOFETIL 1000 MG: 250 CAPSULE ORAL at 21:49

## 2020-01-01 RX ADMIN — HYDROXYCHLOROQUINE SULFATE 200 MG: 200 TABLET ORAL at 09:10

## 2020-01-01 RX ADMIN — INSULIN LISPRO 3 UNITS: 100 INJECTION, SOLUTION INTRAVENOUS; SUBCUTANEOUS at 09:15

## 2020-01-01 RX ADMIN — LORAZEPAM 0.5 MG: 0.5 TABLET ORAL at 20:02

## 2020-01-01 RX ADMIN — INSULIN LISPRO 9 UNITS: 100 INJECTION, SOLUTION INTRAVENOUS; SUBCUTANEOUS at 12:42

## 2020-01-01 RX ADMIN — PROMETHAZINE HYDROCHLORIDE 12.5 MG: 25 TABLET ORAL at 17:40

## 2020-01-01 RX ADMIN — Medication 10 ML: at 09:43

## 2020-01-01 RX ADMIN — Medication 4 MCG/MIN: at 18:40

## 2020-01-01 RX ADMIN — HYDROXYCHLOROQUINE SULFATE 200 MG: 200 TABLET ORAL at 20:02

## 2020-01-01 RX ADMIN — AZITHROMYCIN MONOHYDRATE 500 MG: 500 INJECTION, POWDER, LYOPHILIZED, FOR SOLUTION INTRAVENOUS at 17:28

## 2020-01-01 RX ADMIN — ALBUTEROL SULFATE 2.5 MG: 2.5 SOLUTION RESPIRATORY (INHALATION) at 00:11

## 2020-01-01 RX ADMIN — Medication 10 ML: at 21:27

## 2020-01-01 RX ADMIN — LORAZEPAM 0.5 MG: 0.5 TABLET ORAL at 13:20

## 2020-01-01 RX ADMIN — INSULIN LISPRO 6 UNITS: 100 INJECTION, SOLUTION INTRAVENOUS; SUBCUTANEOUS at 19:49

## 2020-01-01 RX ADMIN — CALCIUM GLUCONATE 2 G: 98 INJECTION, SOLUTION INTRAVENOUS at 11:00

## 2020-01-01 RX ADMIN — DEXAMETHASONE SODIUM PHOSPHATE 10 MG: 4 INJECTION, SOLUTION INTRAMUSCULAR; INTRAVENOUS at 17:20

## 2020-01-01 RX ADMIN — MYCOPHENOLATE MOFETIL 1000 MG: 250 CAPSULE ORAL at 09:13

## 2020-01-01 RX ADMIN — MORPHINE SULFATE 2 MG: 2 INJECTION, SOLUTION INTRAMUSCULAR; INTRAVENOUS at 17:42

## 2020-01-01 RX ADMIN — MAGNESIUM SULFATE HEPTAHYDRATE 2 G: 40 INJECTION, SOLUTION INTRAVENOUS at 11:40

## 2020-01-01 RX ADMIN — FUROSEMIDE 20 MG: 10 INJECTION, SOLUTION INTRAMUSCULAR; INTRAVENOUS at 21:18

## 2020-01-01 RX ADMIN — HYDROXYCHLOROQUINE SULFATE 200 MG: 200 TABLET ORAL at 21:18

## 2020-01-01 RX ADMIN — ENOXAPARIN SODIUM 30 MG: 30 INJECTION SUBCUTANEOUS at 09:43

## 2020-01-01 RX ADMIN — ALBUTEROL SULFATE 2 PUFF: 108 AEROSOL, METERED RESPIRATORY (INHALATION) at 19:50

## 2020-01-01 RX ADMIN — ENOXAPARIN SODIUM 60 MG: 60 INJECTION SUBCUTANEOUS at 21:48

## 2020-01-01 RX ADMIN — INSULIN LISPRO 3 UNITS: 100 INJECTION, SOLUTION INTRAVENOUS; SUBCUTANEOUS at 20:00

## 2020-01-01 RX ADMIN — MORPHINE SULFATE 2 MG: 2 INJECTION, SOLUTION INTRAMUSCULAR; INTRAVENOUS at 21:49

## 2020-01-01 RX ADMIN — INSULIN LISPRO 9 UNITS: 100 INJECTION, SOLUTION INTRAVENOUS; SUBCUTANEOUS at 16:24

## 2020-01-01 RX ADMIN — ALBUTEROL SULFATE 2 PUFF: 108 AEROSOL, METERED RESPIRATORY (INHALATION) at 07:58

## 2020-01-01 RX ADMIN — MYCOPHENOLATE MOFETIL 1000 MG: 250 CAPSULE ORAL at 20:02

## 2020-01-01 RX ADMIN — ENOXAPARIN SODIUM 60 MG: 60 INJECTION SUBCUTANEOUS at 09:11

## 2020-01-01 RX ADMIN — ALBUTEROL SULFATE 2 PUFF: 108 AEROSOL, METERED RESPIRATORY (INHALATION) at 11:15

## 2020-01-01 RX ADMIN — HYDROXYCHLOROQUINE SULFATE 200 MG: 200 TABLET ORAL at 21:48

## 2020-01-01 RX ADMIN — DEXAMETHASONE SODIUM PHOSPHATE 20 MG: 4 INJECTION, SOLUTION INTRA-ARTICULAR; INTRALESIONAL; INTRAMUSCULAR; INTRAVENOUS; SOFT TISSUE at 10:37

## 2020-01-01 RX ADMIN — DEXAMETHASONE SODIUM PHOSPHATE 20 MG: 4 INJECTION, SOLUTION INTRA-ARTICULAR; INTRALESIONAL; INTRAMUSCULAR; INTRAVENOUS; SOFT TISSUE at 09:44

## 2020-01-01 RX ADMIN — LORAZEPAM 0.5 MG: 0.5 TABLET ORAL at 09:10

## 2020-01-01 RX ADMIN — ALBUTEROL SULFATE 2.5 MG: 2.5 SOLUTION RESPIRATORY (INHALATION) at 19:52

## 2020-01-01 RX ADMIN — INSULIN LISPRO 6 UNITS: 100 INJECTION, SOLUTION INTRAVENOUS; SUBCUTANEOUS at 00:07

## 2020-01-01 RX ADMIN — LORAZEPAM 0.5 MG: 0.5 TABLET ORAL at 02:38

## 2020-01-01 RX ADMIN — MYCOPHENOLATE MOFETIL 1000 MG: 250 CAPSULE ORAL at 13:20

## 2020-01-01 RX ADMIN — MORPHINE SULFATE 2 MG: 2 INJECTION, SOLUTION INTRAMUSCULAR; INTRAVENOUS at 02:06

## 2020-01-01 RX ADMIN — ENOXAPARIN SODIUM 30 MG: 30 INJECTION SUBCUTANEOUS at 21:18

## 2020-01-01 RX ADMIN — INSULIN LISPRO 3 UNITS: 100 INJECTION, SOLUTION INTRAVENOUS; SUBCUTANEOUS at 03:46

## 2020-01-01 RX ADMIN — ALBUTEROL SULFATE 2 PUFF: 108 AEROSOL, METERED RESPIRATORY (INHALATION) at 15:56

## 2020-01-01 RX ADMIN — SODIUM CHLORIDE: 9 INJECTION, SOLUTION INTRAVENOUS at 22:26

## 2020-01-01 RX ADMIN — HYDROXYCHLOROQUINE SULFATE 200 MG: 200 TABLET ORAL at 13:20

## 2020-01-01 RX ADMIN — INSULIN LISPRO 3 UNITS: 100 INJECTION, SOLUTION INTRAVENOUS; SUBCUTANEOUS at 16:43

## 2020-01-01 RX ADMIN — ONDANSETRON 4 MG: 2 INJECTION INTRAMUSCULAR; INTRAVENOUS at 14:09

## 2020-01-01 RX ADMIN — INSULIN LISPRO 3 UNITS: 100 INJECTION, SOLUTION INTRAVENOUS; SUBCUTANEOUS at 11:40

## 2020-01-01 RX ADMIN — LISINOPRIL 10 MG: 10 TABLET ORAL at 09:10

## 2020-01-01 RX ADMIN — INSULIN LISPRO 4 UNITS: 100 INJECTION, SOLUTION INTRAVENOUS; SUBCUTANEOUS at 09:44

## 2020-01-01 RX ADMIN — ENOXAPARIN SODIUM 60 MG: 60 INJECTION SUBCUTANEOUS at 20:02

## 2020-01-01 RX ADMIN — LISINOPRIL 10 MG: 10 TABLET ORAL at 13:20

## 2020-01-01 RX ADMIN — SODIUM CHLORIDE 20 ML: 9 INJECTION, SOLUTION INTRAVENOUS at 13:22

## 2020-01-01 ASSESSMENT — PAIN DESCRIPTION - PROGRESSION
CLINICAL_PROGRESSION: GRADUALLY WORSENING
CLINICAL_PROGRESSION: GRADUALLY WORSENING

## 2020-01-01 ASSESSMENT — PAIN SCALES - GENERAL
PAINLEVEL_OUTOF10: 0
PAINLEVEL_OUTOF10: 8
PAINLEVEL_OUTOF10: 0
PAINLEVEL_OUTOF10: 8
PAINLEVEL_OUTOF10: 0

## 2020-01-01 ASSESSMENT — PAIN DESCRIPTION - ONSET
ONSET: ON-GOING
ONSET: ON-GOING

## 2020-01-01 ASSESSMENT — PAIN DESCRIPTION - ORIENTATION: ORIENTATION: MID

## 2020-01-01 ASSESSMENT — PAIN DESCRIPTION - DESCRIPTORS
DESCRIPTORS: ACHING
DESCRIPTORS: ACHING

## 2020-01-01 ASSESSMENT — PAIN DESCRIPTION - PAIN TYPE
TYPE: ACUTE PAIN
TYPE: ACUTE PAIN

## 2020-01-01 ASSESSMENT — PAIN - FUNCTIONAL ASSESSMENT
PAIN_FUNCTIONAL_ASSESSMENT: PREVENTS OR INTERFERES WITH MANY ACTIVE NOT PASSIVE ACTIVITIES
PAIN_FUNCTIONAL_ASSESSMENT: PREVENTS OR INTERFERES WITH MANY ACTIVE NOT PASSIVE ACTIVITIES

## 2020-01-01 ASSESSMENT — PAIN DESCRIPTION - LOCATION
LOCATION: GENERALIZED;CHEST
LOCATION: GENERALIZED;CHEST

## 2020-01-01 ASSESSMENT — PAIN DESCRIPTION - FREQUENCY
FREQUENCY: CONTINUOUS
FREQUENCY: CONTINUOUS

## 2020-10-18 PROBLEM — R09.02 HYPOXIA: Status: ACTIVE | Noted: 2020-01-01

## 2020-10-18 NOTE — ED NOTES
Bed: A-17  Expected date: 10/18/20  Expected time:   Means of arrival: [facility]  Comments:  70 M    CHIP Vargas [facility]  10/18/20 [id number]

## 2020-10-18 NOTE — ED NOTES
Respiratory at bedside with patient to place patient on bipap.       Lindsay Bustamante RN  10/18/20 3700

## 2020-10-18 NOTE — ED PROVIDER NOTES
eMERGENCY dEPARTMENT eNCOUnter      Pt Name: Tristian Chavez  MRN: [id number]  [place] 1945  Date of evaluation: 10/18/2020  Provider: Tree Zelaya MD     [address] [place] [address]       Chief Complaint   Patient presents with    Shortness of Breath     pt brought to ED via EMS from home. SOB since Friday. HX pulm fibrosis- SPo2 in 60's on 8L NC upon EMS arrival. pt 86% on 15L NRB upon arrival to ED. HISTORY OF PRESENT ILLNESS   (Location/Symptom, Timing/Onset,Context/Setting, Quality, Duration, Modifying Factors, Severity) Note limiting factors. HPI    Tristian Chavez is a 70 y.o. male who presents to the emergency department with acute shortness of breath for 1 day. Patient states he has been home for couple weeks and went to the [place] for evaluation couple days ago and ever since then started having diarrhea and shortness of breath. Patient has history of pulmonary fibrosis. Patient is on home O2 about 2 to 5 L. But today he increase his oxygen demand to 10 L at home still was very short of breath. Patient states he could not walk across the room without getting more short of breath. He did call 911. On their arrival his pulse ox was in 60 on oxygen. He was immediately placed on 15 L facemask and his pulse ox still in the low 80s. Is in acute respiratory distress. Has been no cough. Patient does complain of diarrhea for the past couple days. His temperature is 99.4. He denies any exposure to Covid however he was at the [facility] for evaluation past couple days. Nursing Notes were reviewed. REVIEW OFSYSTEMS    (2+ for level 4; 10+ for level 5)   Review of Systems    General: No fevers, chills or night sweats, No weight loss    Head:  No Sore throat,  No Ear Pain    Chest:  Nontender.   No Cough, positive SOB, no chest Pain    GI: No abdominal pain or vomiting    : No dysuria or hematuria    Musculoskeletal: No unrelenting pain or night pain    Neurologic: No bowel or bladder incontinence, No saddle anesthesia, No leg weakness    All other systems reviewed and are negative. PAST MEDICAL HISTORY     Past Medical History:   Diagnosis Date    Chronic kidney disease     Hyperlipidemia     Hypertension     Rheumatoid arthritis ([address]     Type II or unspecified type diabetes mellitus without mention of complication, not stated as uncontrolled     Unspecified sleep apnea     no c-pap-haven't used in 4hours       SURGICAL HISTORY       Past Surgical History:   Procedure Laterality Date    CATARACT REMOVAL      bilateral    HERNIA REPAIR      left inguinal.    REVISION TOTAL KNEE ARTHROPLASTY Left 07/07/2016    Poly exchange    SHOULDER SURGERY      rptator cuff repair-left    TONSILLECTOMY      TOTAL KNEE ARTHROPLASTY Left Feb 2013    TKR left-MD in [place]. CURRENT MEDICATIONS       Previous Medications    ABATACEPT (ORENCIA) 125 MG/ML SOSY    Inject 125 mg into the skin once a week    ACEBUTOLOL (SECTRAL) 400 MG CAPSULE    Take 1 capsule by mouth 2 times daily    ACETAMINOPHEN 650 MG TABS    Take 650 mg by mouth every 4 hours as needed for Pain    CYANOCOBALAMIN (B-12) 500 MCG TABS    Take 1 capsule by mouth 2 times daily     FENOFIBRATE (TRICOR) 145 MG TABLET    TAKE 1 TABLET BY MOUTH DAILY. GLYBURIDE (DIABETA) 5 MG TABLET    TAKE 1 TABLET BY MOUTH 2 TIMES DAILY (WITH MEALS). HYDROXYCHLOROQUINE (PLAQUENIL) 200 MG TABLET    TAKE 1 TABLET BY MOUTH 2 TIMES DAILY. METFORMIN (GLUCOPHAGE) 500 MG TABLET    TAKE 1 TABLET BY MOUTH 2 TIMES DAILY (WITH MEALS)    VALSARTAN (DIOVAN) 160 MG TABLET    Take 1 tablet by mouth daily       ALLERGIES     Patient has no known allergies.     FAMILY HISTORY       Family History   Problem Relation Age of Onset    Heart Failure Brother     High Blood Pressure Mother     Emphysema Mother     Cancer Father         bone        SOCIAL HISTORY       Social History     Socioeconomic History    Marital status:      Spouse name: Becca Dsouza Number of children: 2    Years of education: None    Highest education level: None   Occupational History    Occupation: retired   Social Needs    Financial resource strain: None    Food insecurity     Worry: None     Inability: None    Transportation needs     Medical: None     Non-medical: None   Tobacco Use    Smoking status: Former Smoker     Packs/day: 1.00     Years: 40.00     Pack years: 40.00    Smokeless tobacco: Former User     Quit date: 4/23/2012   Substance and Sexual Activity    Alcohol use: Yes     Alcohol/week: 0.0 standard drinks     Comment: 4-5 drinks in a week.  Drug use: No    Sexual activity: None   Lifestyle    Physical activity     Days per week: None     Minutes per session: None    Stress: None   Relationships    Social connections     Talks on phone: None     Gets together: None     Attends Muslim service: None     Active member of club or organization: None     Attends meetings of clubs or organizations: None     Relationship status: None    Intimate partner violence     Fear of current or ex partner: None     Emotionally abused: None     Physically abused: None     Forced sexual activity: None   Other Topics Concern    None   Social History Narrative    None       SCREENINGS           PHYSICAL EXAM    (up to 7 for level 4, 8 or more for level 5)     ED Triage Vitals   BP Temp Temp src Pulse Resp SpO2 Height Weight   -- -- -- -- -- -- -- --       Physical Exam    General: Alert and awake ×3. Nontoxic appearance. Well-developed well-nourished 46-year-old white male in moderate respiratory distress was immediately placed on a BiPAP machine on arrival.  Breathing a lot better. HEENT: Normocephalic atraumatic. Neck is supple. Airway intact. No adenopathy  Cardiac: Regular rate and rhythm with no murmurs rubs or gallops  Pulmonary: Lungs are clear in all lung fields. No wheezing. Crackles diffusely. Abdomen: Soft and nontender. Negative hepatosplenomegaly.   Bowel sounds are active  Extremities: Moving all extremities. No calf tenderness. Peripheral pulses all intact  Skin: No skin lesions. No rashes  Neurologic: Cranial nerves II through XII was grossly intact. Nonfocal neurological exam  Psychiatric: Patient is pleasant. Mood is appropriate. DIAGNOSTIC RESULTS     EKG (Per Emergency Physician):     Normal sinus rhythm with premature atrial complex nonspecific ST-T wave changes ventricular rate of 97      RADIOLOGY (Per Emergency Physician): Interpretation per the Radiologist below, if available at the time of this note:  Xr Chest Portable    Result Date: 10/18/2020  EXAMINATION: ONE XRAY VIEW OF THE CHEST 10/18/2020 2:31 pm COMPARISON: CT chest 12/21/2015 HISTORY: ORDERING SYSTEM PROVIDED HISTORY: SOB TECHNOLOGIST PROVIDED HISTORY: Reason for exam:->SOB Reason for Exam: Shortness of Breath Acuity: Acute Type of Exam: Initial FINDINGS: Limited due to poor inspiration and body habitus. Previous sternotomy. Cardiomegaly. Diffuse interstitial and airspace opacities in both lungs suggesting vascular congestion and possible edema. There is likely underlying fibrotic change within the lungs. No large effusion or pneumothorax     Limited due to poor inspiration and body habitus. Cardiomegaly with diffuse interstitial airspace opacities suggesting vascular congestion and possible edema. Likely underlying fibrotic change within the lungs.        ED BEDSIDE ULTRASOUND:   Performed by ED Physician - none    LABS:  Labs Reviewed   LACTIC ACID, PLASMA - Abnormal; Notable for the following components:       Result Value    Lactic Acid 3.0 (*)     All other components within normal limits    Narrative:     Performed at:  [address] [address]   Phone [phone]   CBC WITH AUTO DIFFERENTIAL - Abnormal; Notable for the following components:    Hemoglobin 12.9 (*)     Hematocrit 39.4 (*)     Lymphocytes Absolute 0.2 (*)     All other components within normal limits    Narrative:     Performed at:  [address] [email or web address] [address]   Phone [phone]   COMPREHENSIVE METABOLIC PANEL W/ REFLEX TO MG FOR LOW K - Abnormal; Notable for the following components:    Sodium 135 (*)     Chloride 96 (*)     Total Protein 5.5 (*)     Alb 2.9 (*)     All other components within normal limits    Narrative:     Performed at:  [address] [email or web address] [address]   Phone [phone]   TROPONIN - Abnormal; Notable for the following components:    Troponin 0.02 (*)     All other components within normal limits    Narrative:     Performed at:  [address] [address]   Phone [phone]   D-DIMER, QUANTITATIVE - Abnormal; Notable for the following components:    D-Dimer, Quant 1944 (*)     All other components within normal limits    Narrative:     Performed at:  [address] [email or web address] [address]   Phone [phone]   BLOOD GAS, ARTERIAL - Abnormal; Notable for the following components:    pH, Arterial 7.462 (*)     pO2, Arterial 48.3 (*)     Base Excess, Arterial 3.8 (*)     Hemoglobin, Art, Extended 12.9 (*)     O2 Sat, Arterial 84.9 (*)     All other components within normal limits    Narrative:     Performed at:  [address] [email or web address] [address]   Phone [phone] - Abnormal; Notable for the following components:    SARS-CoV-2, NAAT DETECTED (*)     All other components within normal limits    Narrative:     ORDER WAS CANCELLED 10/18/2020 14:55, Cancelled: Sent to [facility].   Performed at:  [address] [email or web address] [address]   Phone [phone]   CULTURE, BLOOD 1 CULTURE, BLOOD 2        All other labs were within normal range or not returned as of this dictation. Procedures      EMERGENCY DEPARTMENT COURSE and DIFFERENTIAL DIAGNOSIS/MDM:   Vitals:    Vitals:    10/18/20 1627 10/18/20 1641 10/18/20 1656 10/18/20 1712   BP: 138/78 (!) 143/70 138/72 (!) 145/95   Pulse: 86 87 84 93   Resp: 20 28 29 26   Temp:       TempSrc:       SpO2: 99% 95% 99% 99%   Weight:       Height:           Medications   0.9 % sodium chloride bolus (1,000 mLs Intravenous New Bag 10/18/20 1646)   azithromycin (ZITHROMAX) 500 mg in D5W 250ml addavial (has no administration in time range)   Dexamethasone Sodium Phosphate injection 10 mg (10 mg Intravenous Given 10/18/20 1720)       MDM. Patient is a 68-year-old male with increased shortness of breath and low pulse ox x1 day. Patient is at risk for Covid. Has Covid PCR was positive. Patient's chest x-ray shows some fluid. His lactic acid was 3. Start fluids and antibiotics. Will discuss with hospitalist to admit to the Covid floor. Does meet the admission criteria for COVID-19. He does comfortable with the BiPAP. Even his COVID-19 is positive I will start some Decadron IV as well as Zithromax. Will discuss with hospitalist for admission. Patient will continue to be on BiPAP. Patient does meet the criteria for admission. Aced on his hypoxemia. REVAL:         CRITICAL CARE TIME   Total CriticalCare time was 30 minutes, excluding separately reportable procedures. There was a high probability of clinically significant/life threatening deterioration in the patient's condition which required my urgent intervention. CONSULTS:  IP CONSULT TO CRITICAL CARE    PROCEDURES:  Unless otherwise noted below, none     [unfilled]    FINAL IMPRESSION      1. Hypoxia    2. COVID-19    3. Respiratory distress          DISPOSITION/PLAN   DISPOSITION        PATIENT REFERRED TO:  No follow-up provider specified.     DISCHARGE MEDICATIONS:  New Prescriptions    No medications on file          (Please note:  Portions of this note were completed with a voice recognition program.Efforts were made to edit the dictations but occasionally words and phrases are mis-transcribed.)  Form v2016. J.5-cn    Edda BRITT MD (electronically signed)  Emergency Medicine Provider        [place] MD Jesse  10/18/20 4335

## 2020-10-18 NOTE — H&P
Hospital Medicine History & Physical      PCP: Nicolas Harry MD    Date of Admission: 10/18/2020    Chief Complaint: Hypoxia, shortness of breath, diarrhea, feeling fatigued    History Of Present Illness:  Patient is a 59-year-old male with past medical history of diabetes mellitus, hypertension, pulmonary fibrosis on Plaquenil, steroids who presents to the hospital for shortness of breath. According to the patient he has watery diarrhea, nonbloody, also has difficulty breathing for the past 3 to 4 days. Patient denies sick contacts, endorses dry cough. Patient mentions he feels fatigue and having chills otherwise denies fevers. He mentions he checked his oxygen at home and it was found low he called EMS and was brought to the hospital.  On arrival to the ED patient was found hypoxic to down 86%. Patient denies dysuria, chest pain nausea vomiting at this time. Past Medical History:          Diagnosis Date    Chronic kidney disease     Hyperlipidemia     Hypertension     [address]umat[facility] ([facility] [address]     Type II or unspecified type diabetes mellitus without mention of complication, not stated as uncontrolled     Unspecified sleep apnea     no c-pap-haven't used in 4hours       Past Surgical History:          Procedure Laterality Date    CATARACT REMOVAL      bilateral    HERNIA REPAIR      left inguinal.    REVISION TOTAL KNEE ARTHROPLASTY Left 07/07/2016    Poly exchange    SHOULDER SURGERY      rptator cuff repair-left    TONSILLECTOMY      TOTAL KNEE ARTHROPLASTY Left Feb 2013    TKR left-MD in [place]. Medications Prior to Admission:      Prior to Admission medications    Medication Sig Start Date End Date Taking? Authorizing Provider   hydroxychloroquine (PLAQUENIL) 200 MG tablet TAKE 1 TABLET BY MOUTH 2 TIMES DAILY. 7/13/17   Nicolas Harry MD   glyBURIDE (DIABETA) 5 MG tablet TAKE 1 TABLET BY MOUTH 2 TIMES DAILY (WITH MEALS).  5/25/17   Nicolas Harry MD   metFORMIN (GLUCOPHAGE) 500 MG tablet TAKE 1 TABLET BY MOUTH 2 TIMES DAILY (WITH MEALS) 5/25/17   Lizabeth oBwen MD   fenofibrate (TRICOR) 145 MG tablet TAKE 1 TABLET BY MOUTH DAILY. 4/28/17   Jannette Palacio MD   acebutolol (SECTRAL) 400 MG capsule Take 1 capsule by mouth 2 times daily 4/28/17   Jannette Palacio MD   valsartan (DIOVAN) 160 MG tablet Take 1 tablet by mouth daily 4/17/17   Lizabeth Bowen MD   Abatacept [facility]) 125 MG/ML SOSY Inject 125 mg into the skin once a week 3/7/17   Onur Porras MD   acetaminophen 650 MG TABS Take 650 mg by mouth every 4 hours as needed for Pain 7/8/16   Deedee Thakkar MD   Cyanocobalamin (B-12) 500 MCG TABS Take 1 capsule by mouth 2 times daily     Historical Provider, MD       Allergies:  Patient has no known allergies. Social History:      TOBACCO:   reports that he has quit smoking. He has a 40.00 pack-year smoking history. He quit smokeless tobacco use about 8 years ago. ETOH:   reports current alcohol use. Family History:       Reviewed in detail and non contributory          Problem Relation Age of Onset    Heart Failure Brother     High Blood Pressure Mother     Emphysema Mother     Cancer Father         bone       REVIEW OF SYSTEMS:   Pertinent positives as noted in the HPI. All other systems reviewed and negative. PHYSICAL EXAM PERFORMED:    /67   Pulse 84   Temp 98.2 °F (36.8 °C) (Axillary)   Resp 21   Ht 5' 8\" (1.727 m)   Wt 246 lb 4.1 oz (111.7 kg)   SpO2 94%   BMI 37.44 kg/m²     General appearance:  No apparent distress, cooperative. Seems to be in mild respiratory distress  HEENT:  Normal cephalic, atraumatic without obvious deformity. Conjunctivae/corneas clear. Neck: Supple, with full range of motion. No cervical lymphadenopathy  Respiratory: Decreased breathing sounds bilaterally  Cardiovascular:  Regular rate and rhythm with normal S1/S2 without murmurs, rubs or gallops.   Abdomen: Soft, non-tender, non-distended, normal bowel sounds. Musculoskeletal:  No edema noted bilaterally. No tenderness on palpation   Skin: no rash visible  Neurologic:  Neurologically intact without any focal sensory/motor deficits. grossly non-focal.  Psychiatric:  Alert and oriented, normal mood  Peripheral Pulses: +2 palpable, equal bilaterally       Labs:     Recent Labs     10/18/20  1447   WBC 7.0   HGB 12.9*   HCT 39.4*        Recent Labs     10/18/20  1447   *   K 4.2   CL 96*   CO2 27   BUN 18   CREATININE 1.0   CALCIUM 8.7     Recent Labs     10/18/20  1447   AST 29   ALT 15   BILITOT 0.4   ALKPHOS 63     No results for input(s): INR in the last 72 hours. Recent Labs     10/18/20  1447   TROPONINI 0.02*       Urinalysis:      Lab Results   Component Value Date    NITRU Negative 12/10/2015    WBCUA 7 12/10/2015    RBCUA 4 12/10/2015    BLOODU Negative 12/10/2015    SPECGRAV 1.019 12/10/2015    GLUCOSEU Negative 12/10/2015       Radiology:       XR CHEST PORTABLE   Final Result   Limited due to poor inspiration and body habitus. Cardiomegaly with diffuse interstitial airspace opacities suggesting vascular   congestion and possible edema. Likely underlying fibrotic change within the   lungs. CT CHEST PULMONARY EMBOLISM W CONTRAST    (Results Pending)           Active Hospital Problems    Diagnosis Date Noted    Hypoxia [R09.02] 10/18/2020       Patient is a 61-year-old male with past medical history of diabetes mellitus, hypertension, pulmonary fibrosis on Plaquenil, steroids who presents to the hospital for shortness of breath. According to the patient he has watery diarrhea, nonbloody, also has difficulty breathing for the past 3 to 4 days. Patient denies sick contacts, endorses dry cough. Patient mentions he feels fatigue and having chills otherwise denies fevers.   He mentions he checked his oxygen at home and it was found low he called EMS and was brought to the hospital.  On arrival to the ED patient was found hypoxic to down 86%. Patient denies dysuria, chest pain nausea vomiting at this time. Assessment  Acute hypoxic respiratory failure requiring BiPAP support likely secondary to COVID-19 pneumonia  Diarrhea likely secondary to COVID-19 viral gastroenteritis  Hypomagnesemia likely GI loss  Lactic acidosis  Elevated troponin likely demand ischemia  Mild hyponatremia likely hypovolemia  Hypoxia, tachypnea, elevated D-dimer, high risk for PE, will check CTA chest to rule out PE  Diabetes mellitus  Hypertension  Pulmonary fibrosis on Plaquenil, steroids, abatacept    Plan  Continue BiPAP support, albuterol inhalations, consult pulmonary COVID-19 precautions, Started on dexamethasone, Lovenox 30 twice daily  Continue to monitor and replace electrolytes  Resume home Plaquenil, metoprolol, valsartan  DVT prophylaxis: Lovenox  Follow-up on CTA chest to rule out PE  Insulin sliding scale  Diet: DIET GENERAL; Carb Control: 3 carb choices (45 gms)/meal  Code Status: Full Code    PT/OT Eval Status: ordered    Dispo - pending clinical improvement       Viktor Marquez MD    The note was completed using EMR and Dragon dictation system. Every effort was made to ensure accuracy; however, inadvertent computerized transcription errors may be present. Thank you Sanchez Godinez MD for the opportunity to be involved in this patient's care. If you have any questions or concerns please feel free to contact me at [phone].     Viktor Marquez MD

## 2020-10-18 NOTE — ACP (ADVANCE CARE PLANNING)
ADVANCED CARE PLANNING    Name:Eric Medina       :  1945              MRN:  [id number]    Purpose of Encounter: Advanced care planning in light of acute and chronic deteriorating medical conditions. Parties in attendance: :Emil Salcedo MD ( myself )    Decisional Capacity: YES      Subjective: Patient/family understand in this voluntary conversation what inteventions and plans patient would want implemented in light of the following diagnoses:    Diagnosis: Acute on chronic hypoxic respiratory failure, COVID-19 infection    Discussion highlights: I discussed with patient advanced directives or impending END of life event in the light of above diagnosis, we discussed the needs for possible CPR, intubation/ventilator support if needed. In such an event patient wishes to remain full code    Goals of Care Determinations: Patient wishes full code but has interest in continuing this conversation, and discussing with family before making any changes to code status and goals of care.      Code Status: Full CODE STATUS    Time Spent: 17 minutes    Electronically signed by Aman Willis MD on 10/18/2020 at 4:57 PM

## 2020-10-19 NOTE — PROGRESS NOTES
4 Eyes Skin Assessment     NAME:  Enrike Hyatt  YOB: 1945  MEDICAL RECORD NUMBER:  [id number]    The patient is being assess for  Shift Handoff    I agree that 2 RN's have performed a thorough Head to Toe Skin Assessment on the patient. ALL assessment sites listed below have been assessed. Areas assessed by both nurses:    Head, Face, Ears, Shoulders, Back, Chest, Arms, Elbows, Hands, Sacrum. Buttock, Coccyx, Ischium and Legs. Feet and Heels        Does the Patient have a Wound?  No noted wound(s)       Jeremie Prevention initiated:  Yes   Wound Care Orders initiated:  NA    Pressure Injury (Stage 3,4, Unstageable, DTI, NWPT, and Complex wounds) if present place consult order under [de-identified] NA    New and Established Ostomies if present place consult order under : NA      Nurse 1 eSignature: Electronically signed by Tanya Holder RN on 10/19/20 at 6:19 PM EDT    **SHARE this note so that the co-signing nurse is able to place an eSignature**    Nurse 2 eSignature: Electronically signed by Tello Cline RN on 10/19/20 at 7:19 PM EDT

## 2020-10-19 NOTE — PROGRESS NOTES
Physician Progress Note      PATIENT:               Mina Link  CSN #:                  [id number]  :                       1945  ADMIT DATE:       10/18/2020 2:17 PM  DISCH DATE:  RESPONDING  PROVIDER #:        Gale Bey MD        QUERY TEXT:    Stage of Chronic Kidney Disease: Please provide further specificity, if known. Options provided:  -- Chronic kidney disease stage 1  -- Chronic kidney disease stage 2  -- Chronic kidney disease stage 3  -- Chronic kidney disease stage 4  -- Chronic kidney disease stage 5  -- Chronic kidney disease stage 5, requiring dialysis  -- End stage renal disease        PROVIDER RESPONSE TEXT:    The patient has chronic kidney disease stage 2.       Electronically signed by:  Gale Bey MD 10/19/2020 12:30 PM

## 2020-10-19 NOTE — PROGRESS NOTES
2035: pt in bed on 95% fi02 & 60L heated high flow cannula & NRB, Sp02 low 90s. Dr. Eleno Klein regarding new order for STAT CT for PE rule/out. 2051:Dr. Tristan Payne confirms that CT can be scheduled for the AM. Due to concerns about pt tolerating transport.    Electronically signed by Walter Kumar RN on 10/19/2020 at 7:32 AM

## 2020-10-19 NOTE — FLOWSHEET NOTE
10/19/20 1546   Encounter Summary   Services provided to: Family   Referral/Consult From: Nurse   Support System Spouse; Children   [facility] [employer] [employer] [facility] [address]   Continue Visiting   (10/19 Support to wife ellis Coburn)   Complexity of Encounter Moderate   Length of Encounter 1 hour   Grief and Life Adjustment   Type New Diagnosis; Adjustment to illness   Assessment Anxious; Coping   Intervention Active listening;Prayer;Discussed belief system/Yarsani practices/damion;Discussed illness/injury and it's impact   Outcome Shared life review;Expressed feelings/needs/concerns

## 2020-10-19 NOTE — PROGRESS NOTES
5331: RT in room at this time for breathing tx. Pt on BIPAP 86-87%, Alert/oriented X4, agreeable to remdesivir. 1012: pt wife Fely Stevens updated via phone at this time. 1015: Danaethel Aceves exits pt room and advises that the pt's code status has changed. Limited code, no to everything. Nurse attempts to call pt wife at this time to advise, no answer. WIll continue to reach wife. [phone]: nurse in to assist pt to void. Pt states,\"I got the bad news from the doctor. I'm ready to meet my maker, I outlived my dad and one of my brothers. Will you call my wife and let her know?\"  21 : 1st unit convalescent plasma infusing at this time  Nurse speaks w/pt wife at pt bedside to advise of pt change in code status and pt wife speaks w/pt who confirms verbally all information given by this nurse to pt wife. 1258: 1st unit convalescent plasma completed, 2nd unit started. 1323: 2nd unit convalescent plasma completed. Pt O2 sat 74% on BIPAP. 1410: Nurse contacts pt wife at this time to advise that she may come to room 2108 to see pt. Pt wife enroute. 1450: pt wife at door to pt room. Pt daughter and son in waiting room. 1500: pt son and daughter at door with mother. 1742: pt given 2mg IV morphine at this time for generalized chest pain due to dyspnea.     Electronically signed by Khoa Lamar RN on 10/19/2020 at 6:14 PM

## 2020-10-19 NOTE — PROGRESS NOTES
Hospitalist Progress Note      PCP: Selin Nieto MD    Date of Admission: 10/18/2020    Subjective: on BIPAP, mild SOB, tired, no fever       Medications:  Reviewed    Infusion Medications    dextrose       Scheduled Medications    albuterol sulfate HFA  2 puff Inhalation 4x daily    lisinopril  10 mg Oral Daily    atorvastatin  40 mg Oral Nightly    mycophenolate  1,000 mg Oral BID    dexamethasone  20 mg Intravenous Q6H    sodium chloride  20 mL Intravenous Once    sodium chloride flush  10 mL Intravenous 2 times per day    enoxaparin  30 mg Subcutaneous BID    insulin lispro  0-12 Units Subcutaneous TID     insulin lispro  0-6 Units Subcutaneous Nightly    hydroxychloroquine  200 mg Oral BID     PRN Meds: sodium chloride flush, potassium chloride, magnesium sulfate, acetaminophen **OR** acetaminophen, polyethylene glycol, promethazine **OR** ondansetron, glucose, dextrose, glucagon (rDNA), dextrose, iopamidol      Intake/Output Summary (Last 24 hours) at 10/19/2020 0734  Last data filed at 10/19/2020 0300  Gross per 24 hour   Intake --   Output 400 ml   Net -400 ml       Physical Exam Performed:    BP (!) 176/71   Pulse 89   Temp 97.7 °F (36.5 °C) (Axillary)   Resp 26   Ht 5' 8\" (1.727 m)   Wt 243 lb 13.3 oz (110.6 kg)   SpO2 90%   BMI 37.07 kg/m²     General appearance: mild distress   Neck: Supple  Respiratory:  Bilateral scattered rhonchi   Cardiovascular: Regular rate and rhythm with normal S1/S2 without murmurs, rubs or gallops. Abdomen: Soft, non-tender, non-distended with normal bowel sounds. Musculoskeletal: No clubbing, cyanosis   Skin: Skin color, texture, turgor normal.  No rashes or lesions.   Neurologic:  No focal weakness   Psychiatric: Alert   Capillary Refill: Brisk,< 3 seconds   Peripheral Pulses: +2 palpable, equal bilaterally       Labs:   Recent Labs     10/18/20  1447 10/19/20  0309   WBC 7.0 2.9*   HGB 12.9* 11.9*   HCT 39.4* 36.6*    158     Recent Labs     10/18/20  1447 10/19/20  0309   * 133*   K 4.2 4.1   CL 96* 95*   CO2 27 23   BUN 18 21*   CREATININE 1.0 0.9   CALCIUM 8.7 7.9*     Recent Labs     10/18/20  1447 10/19/20  0309   AST 29 25   ALT 15 14   BILITOT 0.4 0.4   ALKPHOS 63 57     No results for input(s): INR in the last 72 hours. Recent Labs     10/18/20  1447   TROPONINI 0.02*       Urinalysis:      Lab Results   Component Value Date    NITRU Negative 12/10/2015    WBCUA 7 12/10/2015    RBCUA 4 12/10/2015    BLOODU Negative 12/10/2015    SPECGRAV 1.019 12/10/2015    GLUCOSEU Negative 12/10/2015       Radiology:  CT CHEST PULMONARY EMBOLISM W CONTRAST         XR CHEST PORTABLE   Final Result   Limited due to poor inspiration and body habitus. Cardiomegaly with diffuse interstitial airspace opacities suggesting vascular   congestion and possible edema. Likely underlying fibrotic change within the   lungs. Assessment/Plan:    Active Hospital Problems    Diagnosis    Hypoxia [R09.02]       1. Acute hypoxic respiratory failure requiring BiPAP support likely secondary to COVID-19 pneumonia, keep in ICU, Pulmonlogy consulted. CT chest pending. 2. COVID-19 pneumonia, iv decadron, remdesivir and plasma. 3. Diarrhea likely secondary to COVID-19 viral gastroenteritis  4. High anion gap Lactic acidosis (corrwected to albumin), LA improved, but gap widening and decreased CO2.   5. Elevated troponin likely demand ischemia/hypoxia  6. Diabetes mellitus, SS.   7. Hypertension  8.  Pulmonary fibrosis on Plaquenil, steroids, abatacept    Diet: DIET GENERAL; Carb Control: 3 carb choices (45 gms)/meal  Code Status: Full Code        Ed MD Katrina

## 2020-10-19 NOTE — CONSULTS
REASON FOR CONSULTATION/CC: covid pneumonia       Consult at request of Kentrell Capps MD for      PCP: Bj Shore MD  Established Pulmonologist: Dr. Adi Wagoner 2016, lost to follow-up, seeing Dr. Kincaid Rule: Yeison Justice is a 70y.o. year old male with a history of rheumatoid arthritis with associated interstitial lung disease who presents with increased shortness of breath, diarrhea in the last 3 to 4 days with associated fatigue and chills he was tested positive for COVID-19 in the emergency room. He was put on supplemental oxygen in the emergency room. He was increased to BiPAP secondary to hypoxemia. PAST MEDICAL HISTORY:  Past Medical History:   Diagnosis Date    Chronic kidney disease     Hyperlipidemia     Hypertension     Rheumatoid arthritis ([facility] [address]     Type II or unspecified type diabetes mellitus without mention of complication, not stated as uncontrolled     Unspecified sleep apnea     no c-pap-haven't used in 4hours       PAST SURGICAL HISTORY:  Past Surgical History:   Procedure Laterality Date    CATARACT REMOVAL      bilateral    HERNIA REPAIR      left inguinal.    REVISION TOTAL KNEE ARTHROPLASTY Left 07/07/2016    Poly exchange    SHOULDER SURGERY      rptator cuff repair-left    TONSILLECTOMY      TOTAL KNEE ARTHROPLASTY Left Feb 2013    TKR left-MD in [place]. FAMILY HISTORY:  family history includes Cancer in his father; Emphysema in his mother; Heart Failure in his brother; High Blood Pressure in his mother. SOCIAL HISTORY:   reports that he has quit smoking. He has a 40.00 pack-year smoking history. He quit smokeless tobacco use about 8 years ago.     Scheduled Meds:   albuterol sulfate HFA  2 puff Inhalation 4x daily    lisinopril  10 mg Oral Daily    atorvastatin  40 mg Oral Nightly    mycophenolate  1,000 mg Oral BID    sodium chloride flush  10 mL Intravenous 2 times per day    enoxaparin  30 mg Subcutaneous BID    dexamethasone  6 mg Intravenous Daily    insulin lispro  0-12 Units Subcutaneous TID WC    insulin lispro  0-6 Units Subcutaneous Nightly    hydroxychloroquine  200 mg Oral BID       Continuous Infusions:   dextrose         PRN Meds:  sodium chloride flush, potassium chloride, magnesium sulfate, acetaminophen **OR** acetaminophen, polyethylene glycol, promethazine **OR** ondansetron, glucose, dextrose, glucagon (rDNA), dextrose, iopamidol    ALLERGIES:  Patient has No Known Allergies. REVIEW OF SYSTEMS:  Constitutional: Negative for fever   HENT: Negative for sore throat  Eyes: Negative for redness   Respiratory: ++ for dyspnea, + cough  Cardiovascular: Negative for chest pain  Gastrointestinal: Negative for vomiting, diarrhea   Genitourinary: Negative for hematuria   Musculoskeletal: Negative for arthralgias   Skin: Negative for rash  Neurological: Negative for syncope  Hematological: Negative for adenopathy  Psychiatric/Behavorial: Negative for anxiety    Objective:   PHYSICAL EXAM:  Blood pressure (!) 158/86, pulse 94, temperature 97.7 °F (36.5 °C), temperature source Axillary, resp. rate 27, height 5' 8\" (1.727 m), weight 243 lb 13.3 oz (110.6 kg), SpO2 90 %.'  Gen: No distress. Eyes: PERRL. No sclera icterus. No conjunctival injection. ENT: No discharge. Pharynx clear. External appearance of ears and nose normal. bipap   Neck: Trachea midline. No obvious mass. Resp: No accessory muscle use. + crackles. No wheezes. No rhonchi.  bipap   CV: Regular rate. Regular rhythm. No murmur or rub. No edema. GI: Non-tender. Non-distended. No hernia. Skin: Warm, dry, normal texture and turgor. No nodule on exposed extremities. Lymph: No cervical LAD. No supraclavicular LAD. M/S: No cyanosis. No clubbing. No joint deformity. Neuro: Moves all four extremities. Psych: Oriented x 3. No anxiety. Awake. Alert. Intact judgement and insight.       Data Reviewed:   LABS:  CBC:   Recent Labs 10/18/20  1447 10/19/20  0309   WBC 7.0 2.9*   HGB 12.9* 11.9*   HCT 39.4* 36.6*   MCV 92.0 92.7    158     BMP:   Recent Labs     10/18/20  1447 10/19/20  0309   * 133*   K 4.2 4.1   CL 96* 95*   CO2 27 23   BUN 18 21*   CREATININE 1.0 0.9     LIVER PROFILE:   Recent Labs     10/18/20  1447 10/19/20  0309   AST 29 25   ALT 15 14   BILITOT 0.4 0.4   ALKPHOS 63 57     PT/INR: No results for input(s): PROTIME, INR in the last 72 hours. APTT: No results for input(s): APTT in the last 72 hours. UA:No results for input(s): NITRITE, COLORU, PHUR, LABCAST, WBCUA, RBCUA, MUCUS, TRICHOMONAS, YEAST, BACTERIA, CLARITYU, SPECGRAV, LEUKOCYTESUR, UROBILINOGEN, BILIRUBINUR, BLOODU, GLUCOSEU, AMORPHOUS in the last 72 hours. Invalid input(s): KETONESU  Recent Labs     10/18/20  1436   PHART 7.462*   ROT2PWH 38.9   PO2ART 48.3*       Vent Information  Skin Assessment: Clean, dry, & intact  FiO2 : 100 %  SpO2: 90 %  SpO2/FiO2 ratio: 90  I Time/ I Time %: 0.9 s  Humidification Source: Heated wire  Humidification Temp: 34  Humidification Temp Measured: 34  Mask Type: Full face mask  Mask Size: Large    Radiology Review:  Pertinent images / reports were reviewed as a part of this visit. CT Chest w/ contrast: No results found for this or any previous visit. CT Chest w/o contrast: No results found for this or any previous visit. CTPA: No results found for this or any previous visit. CXR PA/LAT:   Results for orders placed in visit on 06/19/18   XR CHEST STANDARD (2 VW)    Narrative Exam: XR CHEST PA AND LATERAL dated 6/19/2018 10:07 AM EDT    CLINICAL HISTORY: Coronary Artery Disease; Allow Radiology to Protocol;  s/p CABG ;     COMPARISON: [place] 15, 2018; June 14, 2018    FINDINGS :Standard lateral digital radiograph was obtained of the chest along with dual energy   digital PA radiograph of the chest with subtraction. Right internal jugular approach central   venous catheter tip projects in the SVC. Sternotomy wires are again demonstrated. Cardiac and   mediastinal contours are partly obscured, not definitely changed. Lung volumes are low. Again   are demonstrated ill-defined opacities bilaterally with more focal consolidation at the   retrocardiac left lung base which may be increased. No pneumothorax or large effusion is   demonstrated. IMPRESSION:     Persistent pulmonary edema with increased left basilar opacity, likely atelectasis. Report Verified by: Michaelle Mccann M.D. at 6/19/2018 11:19 AM EDT         CXR portable:   Results for orders placed during the hospital encounter of 10/18/20   XR CHEST PORTABLE    Narrative EXAMINATION:  ONE XRAY VIEW OF THE CHEST    10/18/2020 2:31 pm    COMPARISON:  CT chest 12/21/2015    HISTORY:  ORDERING SYSTEM PROVIDED HISTORY: SOB  TECHNOLOGIST PROVIDED HISTORY:  Reason for exam:->SOB  Reason for Exam: Shortness of Breath  Acuity: Acute  Type of Exam: Initial    FINDINGS:  Limited due to poor inspiration and body habitus. Previous sternotomy. Cardiomegaly. Diffuse interstitial and airspace opacities in both lungs  suggesting vascular congestion and possible edema. There is likely  underlying fibrotic change within the lungs. No large effusion or  pneumothorax      Impression Limited due to poor inspiration and body habitus. Cardiomegaly with diffuse interstitial airspace opacities suggesting vascular  congestion and possible edema. Likely underlying fibrotic change within the  lungs. Assessment:        Diabetes mellitus  Hypertension  Rheumatoid arthritis with associated interstitial lung disease, on Plaquenil  Grade II diastolic dysfunction  Chronic hypoxemia, 3-5L baseline.      Plan:      COVID-19  -Increase dexamethasone to 20 mg daily  -Order plasma x2 units  -Start Remdesivir  -Anticoagulation per emergency protocol.  -Follow procalcitonin    Rheumatoid arthritis with associated interstitial lung disease  -Home dose Plaquenil and CellCept, continue.   - Dr. Magdalene Manuel. Acute hypoxemic respiratory failure  -Patient has ARDS secondary to viral pneumonia compounded by underlying interstitial lung disease. Currently 100% oxygen on BiPAP. 90% saturation. Intubation highly likely.  -Discuss CODE STATUS, changed to DNR/I CCA. Continue bipap. Diabetes mellitus  -Per hospitalist      Hypertension  -Per hospitalist  - lisinopril     Diarrhea  -Agree likely secondary to viral gastroenteritis.  -Check C. Difficile   -    Grade 2 diastolic dysfunction  -Agree with Lasix. Focus on even to negative fluid status. Electrolytes  - K:   - Ca:  - Mg:  - Phos:    Prophylaxis  - DVT -  lovenox         Nutrition  - DIET GENERAL; Carb Control: 3 carb choices (45 gms)/meal   - limited secondary to hypoxemia. Mobility       Access  Arterial      PICC          CVC               I spent 45 minutes of critical care time with this patient excluding any procedures. This note was transcribed using [id number] [employer]. Please disregard any translational errors.     Thank you for the consult    Mer[facility]  [phone]

## 2020-10-20 NOTE — PROGRESS NOTES
Attending physician addendum:      I have personally seen and examined the patient, reviewed the patient's medical record and pertinent labs and clinical imaging. I have personally staffed the case with [place] ASHLEY. I agree with her consultation note, exam findings, assessment and plans  as written above. I have made appropriate modifications and edited her assessment and plan where needed to reflect my impression and plans for this patient. 70year old with sleep apnea, type 2 DM, rheumatoid arthritis with interstitial lung disease, HTN, HLP, CKD, total knee arthroscopy, tonsillectomy, shoulder surgery, left inguinal hernia repair, cataract removal.  He is on cellcept and prednisone as an outpatient. Presented with shortness of breath and diarrhea for 3-4 days. Denies any abdominal pain. + for COVID-19. Asked to see for abnormal LFT\"s. BUN 18 and Cr 1.0. LFT's normal.  CBC 7, 12.9/39.4, 196. Repeat LFT's today showed , , alk phos 127, bili 0.7. To preserve PPE and avoid spread of COVID, I did not examine this patient. Imp:  1. Abnormal LFT\"s likely from COVID based on acuity. Will follow for now. If persistently elevated will need to consider ultrasound. Biliary stones would cause pain which he does not have. Acute hepatitis from hep a, b, c would be managed supportively. Doubt acute autoimmune hepatitis, soledad's. At this point, I think best approach is observation of LFT's as I think this is from [place]. If LFT's worsen or do not improve, will send work-up for alternate etiologies. I reviewed medications. Atorvastatin on hold. Other meds should be okay  2. Acute diarrhea. C. Diff ordered. Will send stool leukocytes. Suspect this is also related to [place]. Thank you for allowing me to participate in this patient's care.   If there are any questions or concerns regarding this patient, or the plan we have set in place, please feel free to contact me at

## 2020-10-20 NOTE — CARE COORDINATION
INITIAL CASE MANAGEMENT ASSESSMENT    Reviewed chart, spouse called d/t pt being in precautions to assess possible discharge needs. Explained Case Management role/services. Living Situation: lives at home with wife    ADLs: normally independent, able to walk a short distance around the house, children help as needed     DME: has a ramp     PT/OT Recs:      Active Services: none     Transportation: normally drives, spouse or children will transport home at dc     Medications: normally independent    PCP: Dr Yoly Stark      HD/PD: na    PLAN/COMMENTS: spouse is unsure pt will be well enough to come home at dc. She doesn't think he will be well enough. She reports pt was independent and she hopes he can return home but unsure he will make it at this point. SW expressed we will help with whatever is needed re dc plan for home or rehab or whatever else. She appreciated call but unable to plan for what is needed at dc at this time. SW/CM provided contact information for patient or family to call with any questions. SW/CM will follow and assist as needed.   Electronically signed by [id number] KEVIN Londono on 10/20/2020 at 11:24 AM

## 2020-10-20 NOTE — PROGRESS NOTES
Pt BP in low 80s and high 70s. Pt's mental status is also declining. Very lethargic at this time. Dr. Jaime Minium notified. Levo started. Pt's wife updated on plan of care and pt's condition.

## 2020-10-20 NOTE — PROGRESS NOTES
Hospitalist Progress Note      PCP: Elijio Claude, MD    Date of Admission: 10/18/2020        Subjective: feels ok on BIPAP, no palpitation, chest pain. Medications:  Reviewed    Infusion Medications    dextrose       Scheduled Medications    albuterol sulfate HFA  2 puff Inhalation 4x daily    lisinopril  10 mg Oral Daily    atorvastatin  40 mg Oral Nightly    mycophenolate  1,000 mg Oral BID    dexamethasone  20 mg Intravenous Q24H    insulin lispro  0-18 Units Subcutaneous Q4H    lidocaine 1 % injection  5 mL Intradermal Once    sodium chloride flush  10 mL Intravenous 2 times per day    remdesivir IVPB  100 mg Intravenous Q24H    enoxaparin  0.5 mg/kg Subcutaneous BID    hydroxychloroquine  200 mg Oral BID     PRN Meds: sodium chloride flush, LORazepam, morphine, potassium chloride, magnesium sulfate, acetaminophen **OR** acetaminophen, polyethylene glycol, promethazine **OR** ondansetron, glucose, dextrose, glucagon (rDNA), dextrose, iopamidol      Intake/Output Summary (Last 24 hours) at 10/20/2020 0817  Last data filed at 10/20/2020 0630  Gross per 24 hour   Intake 1024.38 ml   Output 450 ml   Net 574.38 ml       Physical Exam Performed:    BP (!) 147/90   Pulse 106   Temp 96.9 °F (36.1 °C) (Axillary)   Resp (!) 33   Ht 5' 8\" (1.727 m)   Wt 250 lb 10.6 oz (113.7 kg)   SpO2 (!) 74%   BMI 38.11 kg/m²     General appearance: No apparent distress  Neck: Supple  Respiratory:  Normal respiratory effort. Clear to auscultation, bilaterally without Rales/Wheezes/Rhonchi. Cardiovascular: Regular rate and rhythm with normal S1/S2 without murmurs, rubs or gallops. Abdomen: Soft, non-tender  Musculoskeletal: No clubbing, cyanosis  Skin: Skin color, texture, turgor normal.  No rashes or lesions.   Neurologic:  Moving all extremities   Psychiatric: Alert   Capillary Refill: Brisk,< 3 seconds   Peripheral Pulses: +2 palpable, equal bilaterally       Labs:   Recent Labs     10/18/20  1447 10/19/20  0309 10/20/20  0441   WBC 7.0 2.9* 10.1   HGB 12.9* 11.9* 12.7*   HCT 39.4* 36.6* 37.8*    158 207     Recent Labs     10/18/20  1447 10/19/20  0309 10/20/20  0441   * 133* 132*   K 4.2 4.1 3.9   CL 96* 95* 92*   CO2 27 23 24   BUN 18 21* 37*   CREATININE 1.0 0.9 1.3   CALCIUM 8.7 7.9* 8.4   PHOS  --   --  3.8     Recent Labs     10/18/20  1447 10/19/20  0309 10/20/20  0441   AST 29 25 863*   ALT 15 14 460*   BILITOT 0.4 0.4 0.7   ALKPHOS 63 57 127     No results for input(s): INR in the last 72 hours. Recent Labs     10/18/20  1447   TROPONINI 0.02*       Urinalysis:      Lab Results   Component Value Date    NITRU Negative 12/10/2015    WBCUA 7 12/10/2015    RBCUA 4 12/10/2015    BLOODU Negative 12/10/2015    SPECGRAV 1.019 12/10/2015    GLUCOSEU Negative 12/10/2015       Radiology:  XR CHEST PORTABLE   Final Result   Limited due to poor inspiration and body habitus. Cardiomegaly with diffuse interstitial airspace opacities suggesting vascular   congestion and possible edema. Likely underlying fibrotic change within the   lungs. Assessment/Plan:    Active Hospital Problems    Diagnosis    COVID-19 [U07.1]    Respiratory distress [R06.03]    Hypoxia [R09.02]     1. Acute hypoxic respiratory failure requiring BiPAP support likely secondary to COVID-19 pneumonia, keep in ICU, Pulmonlogy consulted. Worsening. 2. COVID-19 pneumonia, iv decadron, remdesivir and plasma. likely will need to discontinue remdesivir with his renal function. 3. Diarrhea likely secondary to COVID-19 viral gastroenteritis  4. High anion gap Lactic acidosis (corrwected to albumin), LA increased. 5. Elevated troponin likely demand ischemia/hypoxia  6. Diabetes mellitus, SS.   7. Hypertension  8. Pulmonary fibrosis on Plaquenil, steroids, abatacept  9. Elevated LFT, monitor for now.        Diet: DIET GENERAL; Carb Control: 4 carb choices (60 gms)/meal  Dietary Nutrition Supplements: Diabetic

## 2020-10-20 NOTE — PLAN OF CARE
Problem: Airway Clearance - Ineffective  Goal: Achieve or maintain patent airway  Outcome: Ongoing  Note: Pt HOB maintained at at least 30 degrees. Problem: Gas Exchange - Impaired  Goal: Absence of hypoxia  Outcome: Ongoing  Note: Pt on bipap 100% FiO2 satting in mid-70s. Pt [address] and does not wish for intubation. Problem: Breathing Pattern - Ineffective  Goal: Ability to achieve and maintain a regular respiratory rate  Outcome: Ongoing  Note: Pt's breathing is labored and tachypneic. PRN morphine given Q4H for increased work of breathing. Problem: Body Temperature -  Risk of, Imbalanced  Goal: Ability to maintain a body temperature within defined limits  Outcome: Ongoing  Note: Pt afebrile and regulating body temp appropriately. Problem: Body Temperature -  Risk of, Imbalanced  Goal: Will regain or maintain usual level of consciousness  Outcome: Ongoing  Note: Pt AxOx4. Problem: Isolation Precautions - Risk of Spread of Infection  Goal: Prevent transmission of infection  Outcome: Ongoing  Note: Pt in droplet plus isolation for COVID19. Problem: Nutrition Deficits  Goal: Optimize nutrtional status  Outcome: Ongoing  Note: Pt on carb conreol diet, tolerating PO fluids. Problem: Risk for Fluid Volume Deficit  Goal: Maintain normal heart rhythm  Outcome: Ongoing  Note: Pt is in low ST on monitor. Problem: Skin Integrity:  Goal: Will show no infection signs and symptoms  Description: Will show no infection signs and symptoms  Outcome: Ongoing  Note: Pt turned and repositioned as needed. Pillow support provided. Problem: Falls - Risk of:  Goal: Will remain free from falls  Description: Will remain free from falls  Outcome: Ongoing  Note: Pt's bed in lowest position, wheels locked, 3/4 side-rails up, bed alarm on, call light and bedside table within reach.

## 2020-10-20 NOTE — CONSULTS
Number of children: 2    Years of education: None    Highest education level: None   Occupational History    Occupation: retired   Social Needs    Financial resource strain: None    Food insecurity     Worry: None     Inability: None    Transportation needs     Medical: None     Non-medical: None   Tobacco Use    Smoking status: Former Smoker     Packs/day: 1.00     Years: 40.00     Pack years: 40.00    Smokeless tobacco: Former User     Quit date: 4/23/2012   Substance and Sexual Activity    Alcohol use: Yes     Alcohol/week: 0.0 standard drinks     Comment: 4-5 drinks in a week.     Drug use: No    Sexual activity: None   Lifestyle    Physical activity     Days per week: None     Minutes per session: None    Stress: None   Relationships    Social connections     Talks on phone: None     Gets together: None     Attends Jew service: None     Active member of club or organization: None     Attends meetings of clubs or organizations: None     Relationship status: None    Intimate partner violence     Fear of current or ex partner: None     Emotionally abused: None     Physically abused: None     Forced sexual activity: None   Other Topics Concern    None   Social History Narrative    None       MEDICATIONS   SCHEDULED:  albuterol sulfate HFA, 2 puff, 4x daily  [Held by provider] lisinopril, 10 mg, Daily  [Held by provider] atorvastatin, 40 mg, Nightly  mycophenolate, 1,000 mg, BID  dexamethasone, 20 mg, Q24H  insulin lispro, 0-18 Units, Q4H  enoxaparin, 0.5 mg/kg, BID  hydroxychloroquine, 200 mg, BID      FLUIDS/DRIPS:     dextrose       PRNs: LORazepam, 0.5 mg, Q6H PRN  morphine, 2 mg, Q4H PRN  potassium chloride, 10 mEq, PRN  magnesium sulfate, 2 g, PRN  acetaminophen, 650 mg, Q6H PRN    Or  acetaminophen, 650 mg, Q6H PRN  polyethylene glycol, 17 g, Daily PRN  promethazine, 12.5 mg, Q6H PRN    Or  ondansetron, 4 mg, Q6H PRN  glucose, 15 g, PRN  dextrose, 12.5 g, PRN  glucagon (rDNA), 1 mg, PRN  dextrose, 100 mL/hr, PRN  iopamidol, 75 mL, ONCE PRN      ALLERGIES:  He No Known Allergies    REVIEW OF SYSTEMS   Pertinent ROS noted in HPI    PHYSICAL EXAM     Vitals:    10/20/20 1200 10/20/20 1300 10/20/20 1400 10/20/20 1556   BP: (!) 84/57 98/67 135/77    Pulse: 103 110 111    Resp: 27 26 30 22   Temp: 96.7 °F (35.9 °C)      TempSrc: Axillary      SpO2: (!) 74% (!) 73% (!) 71% (!) 77%   Weight:       Height:           I/O last 3 completed shifts: In: 439 [P.O.:240; I.V.:99; IV Piggyback:100]  Out: 350 [Urine:350]      Physical Exam:  Not performed due to COVID-19 infection    LABS AND IMAGING   Laboratory   Recent Labs     10/18/20  1447 10/19/20  0309 10/20/20  0441   WBC 7.0 2.9* 10.1   HGB 12.9* 11.9* 12.7*   HCT 39.4* 36.6* 37.8*   MCV 92.0 92.7 91.4    158 207     Recent Labs     10/18/20  1447 10/19/20  0309 10/20/20  0441   * 133* 132*   K 4.2 4.1 3.9   CL 96* 95* 92*   CO2 27 23 24   PHOS  --   --  3.8   BUN 18 21* 37*   CREATININE 1.0 0.9 1.3     Recent Labs     10/18/20  1447 10/19/20  0309 10/20/20  0441   AST 29 25 863*   ALT 15 14 460*   BILITOT 0.4 0.4 0.7   ALKPHOS 63 57 127     No results for input(s): LIPASE, AMYLASE in the last 72 hours. No results for input(s): PROTIME, INR in the last 72 hours. Imaging  XR CHEST PORTABLE   Final Result   Limited due to poor inspiration and body habitus. Cardiomegaly with diffuse interstitial airspace opacities suggesting vascular   congestion and possible edema. Likely underlying fibrotic change within the   lungs. ASSESSMENT AND RECOMMENDATIONS   70 y.o. male with a PMH of HTN, HLD, CKD, pulmonary fibrosis and DM2 who presented on 10/18/2020 with hypoxia, SOB, and nausea. Patient was hypoxic on arrival and found to be COVID +. Patient was put on BiPAP d/t hypoxemia. We have been consulted regarding transaminitis. IMPRESSION:    1.  Transaminitis  -LFTs on admission grossly normal.  CMP this morning showed ALT of 460 and AST of 863  -Suspect 2/2 COVID infection. Was recently on Remdesivir, has been held. Atorvastatin on hold as well.    -No report of any abdominal pain. 2. Diarrhea  -Suspect 2/2 viral gastroenteritis from [facility]. Stool studies ordered. RECOMMENDATIONS:      1) Will continue to monitor shilo of LFTs. If continue to worse, can consider imaging for further evaluation. Stool studies ordered. If you have any questions or need any further information, please feel free to contact our consult team.  Thank you for allowing us to participate in the care of Jacey Aaron. The note was completed using Dragon voice recognition transcription. Every effort was made to ensure accuracy; however, inadvertent transcription errors may be present despite my best efforts to edit errors.       Carrie Mares PA-C

## 2020-10-21 NOTE — PROGRESS NOTES
During assessment, pt extremely anxious, frequently sitting up and resting back. RR in 46s. This RN help pt to control breathing, breathe in through nose and out through mouth. Pt attempts, but after 2-3 breaths returns to mouth breathing. Continues to pick arms up and then drop, as well as pick head up then drop. This RN provided emotional support to help calm pt and control breathing. Encourage pt to try and sleep and allow BiPAP to control breathing. Pt resting with eyes closed at this time.

## 2020-10-21 NOTE — DISCHARGE SUMMARY
Patient admitted with respiratory failure, COVID 19 pneumonia, admitted to ICU, was on BIPAP, critical care team was following, steroids, remdesivir used, patient continued to decline and pronounced dead on 10/21/2020 at 05:00 am  Dx at the time of death  1. Acute hypoxic respiratory failure   2. COVID-19 pneumonia  3. Diarrhea   4. High anion gap Lactic acidosis  5. Elevated troponin   6. Diabetes mellitus. 7. Hypertension  8. Pulmonary fibrosis on Plaquenil, steroids, abatacept  9.  Elevated LFT

## 2020-10-21 NOTE — PROGRESS NOTES
Pt call this RN into room. Appears anxious and SOB. Given sips of water. Given PRN Ativan and Morphine. Pt attempting to relax and sleep when this RN leaves room.

## 2020-10-21 NOTE — PROGRESS NOTES
0407: Pt breathing slowing, pt becoming unresponsive. HR dropping into 50s. Pt limited code, no to everything. Call placed to pt wife, allowed pt wife to speak into pt ear. Charge RN called to bedside. 0430-Pt HR with wide complex in 30s. Wife on phone. 0500-Asystole. Dr. Saskia Medina at bedside to pronounce. Pt wife hung up with pt, called back to confirm TOD and confirm  home plans. 0504-Call placed to [phone]. Pt not a donation candidate. Verification number [id number]  0512-Call placed to Dong Clarke at  office. Pt not a  case, body released. Form completed, copy placed in chart. 0516-Body prepared and placed in bag.   0520-Pt to go to [facility], this RN notified home of pt passing. 0526-Security notified to  pt.   0541-Pt taken to [facility] by [facility] on stretcher.  Belongings provided to [employer].     Electronically signed by Gabriel Caicedo RN on 10/21/2020 at 5:42 AM

## 2020-10-22 LAB
BLOOD CULTURE, ROUTINE: NORMAL
CULTURE, BLOOD 2: NORMAL

## 2022-04-12 NOTE — PROGRESS NOTES
Pulmonary Progress Note    Date of Admission: 10/18/2020   LOS: 2 days       CC:  Acute hypoxemia    Subjective:  Continues to have shortness of breath. Continues to need / require bipap     ROS:   Limited secondary to bipap       PHYSICAL EXAM:   Blood pressure (!) 147/90, pulse 106, temperature 96.9 °F (36.1 °C), temperature source Axillary, resp. rate (!) 33, height 5' 8\" (1.727 m), weight 250 lb 10.6 oz (113.7 kg), SpO2 (!) 74 %.'  Gen: No distress. ENT: on bipap  Resp: No accessory muscle use. No crackles. No wheezes. No rhonchi. CV: Regular rate. Regular rhythm. No murmur or rub. No edema. Skin: Warm, dry, normal texture and turgor. No nodule on exposed extremities. M/S: No cyanosis. No clubbing. No joint deformity. Psych: Oriented x 3. No anxiety. Awake. Alert. Intact judgement and insight. Good Mood / Affect.   Memory appears in tact        Medications:    Scheduled Meds:   albuterol sulfate HFA  2 puff Inhalation 4x daily    lisinopril  10 mg Oral Daily    atorvastatin  40 mg Oral Nightly    mycophenolate  1,000 mg Oral BID    dexamethasone  20 mg Intravenous Q24H    insulin lispro  0-18 Units Subcutaneous Q4H    lidocaine 1 % injection  5 mL Intradermal Once    sodium chloride flush  10 mL Intravenous 2 times per day    remdesivir IVPB  100 mg Intravenous Q24H    enoxaparin  0.5 mg/kg Subcutaneous BID    hydroxychloroquine  200 mg Oral BID       Continuous Infusions:   dextrose         PRN Meds:  sodium chloride flush, LORazepam, morphine, potassium chloride, magnesium sulfate, acetaminophen **OR** acetaminophen, polyethylene glycol, promethazine **OR** ondansetron, glucose, dextrose, glucagon (rDNA), dextrose, iopamidol    Labs reviewed:  CBC:   Recent Labs     10/18/20  1447 10/19/20  0309 10/20/20  0441   WBC 7.0 2.9* 10.1   HGB 12.9* 11.9* 12.7*   HCT 39.4* 36.6* 37.8*   MCV 92.0 92.7 91.4    158 207     BMP:   Recent Labs     10/18/20  1447 10/19/20  0309 10/20/20  0449 * 133* 132*   K 4.2 4.1 3.9   CL 96* 95* 92*   CO2 27 23 24   PHOS  --   --  3.8   BUN 18 21* 37*   CREATININE 1.0 0.9 1.3     LIVER PROFILE:   Recent Labs     10/18/20  1447 10/19/20  0309 10/20/20  0441   AST 29 25 863*   ALT 15 14 460*   BILITOT 0.4 0.4 0.7   ALKPHOS 63 57 127     PT/INR: No results for input(s): PROTIME, INR in the last 72 hours. APTT: No results for input(s): APTT in the last 72 hours. UA:No results for input(s): NITRITE, COLORU, PHUR, LABCAST, WBCUA, RBCUA, MUCUS, TRICHOMONAS, YEAST, BACTERIA, CLARITYU, SPECGRAV, LEUKOCYTESUR, UROBILINOGEN, BILIRUBINUR, BLOODU, GLUCOSEU, AMORPHOUS in the last 72 hours. Invalid input(s): Chloé Rose  No results for input(s): PH, PCO2, PO2 in the last 72 hours. Cx:      Films:       Assessment:     Diabetes mellitus  Hypertension  Rheumatoid arthritis with associated interstitial lung disease, on Plaquenil  Grade II diastolic dysfunction  Chronic hypoxemia, 3-5L baseline.      Plan:      COVID-19  - dexamethasone to 20 mg daily  - plasma x2 units  - Remdesivir. Hold secondary to transaminitis  -Anticoagulation per emergency protocol.  -Follow procalcitonine  - volume status controlled. - bipap dependant at this point secondary to hypoxemia. 100% saturation of 74%. Transaminitis  - significant increased over night.    - likely secondary to covid. - hold remdesivir.   -       Renal  - will hold lisinopril with slight increased in Cr.      Rheumatoid arthritis with associated interstitial lung disease  -Home dose Plaquenil and CellCept, continue.   - Dr. Garcia Rider.          Acute hypoxemic respiratory failure  -Patient has ARDS secondary to viral pneumonia compounded by underlying interstitial lung disease. Currently 100% oxygen on BiPAP. 90% saturation. Intubation highly likely. -  DNR/I CCA.   Continue bipap.         Diabetes mellitus  -Per hospitalist        Hypertension  -Per hospitalist  - lisinopril      Diarrhea  -Agree likely secondary to viral gastroenteritis.  -Check C. Difficile   -     Grade 2 diastolic dysfunction  -Agree with Lasix. Focus on even to negative fluid status.     Electrolytes  - K:   - Ca:  - Mg:  - Phos:     Prophylaxis  - DVT -  lovenox      Family updated. Nutrition  - DIET GENERAL; Carb Control: 4 carb choices (60 gms)/meal  Dietary Nutrition Supplements: Diabetic Oral Supplement  -     Intake/Output Summary (Last 24 hours) at 10/20/2020 1013  Last data filed at 10/20/2020 0630  Gross per 24 hour   Intake 1024.38 ml   Output 450 ml   Net 574.38 ml              I spent 40 minutes of critical care time with this patient excluding any procedures. This note was transcribed using [id number] [employer]. Please disregard any translational errors.       [facility] [phone] Never smoker

## 2023-10-09 NOTE — PROGRESS NOTES
Problem: Diabetes Comorbidity  Goal: Blood Glucose Level Within Targeted Range  Outcome: Ongoing, Progressing     Problem: Bariatric Environmental Safety  Goal: Safety Maintained with Care  Outcome: Ongoing, Progressing     Problem: Fall Injury Risk  Goal: Absence of Fall and Fall-Related Injury  Outcome: Ongoing, Progressing     Problem: Skin or Soft Tissue Infection  Goal: Absence of Infection Signs and Symptoms  Outcome: Ongoing, Progressing      Late entries:    [phone]: Pt arrived to ICU Rm 2108 via stretcher on NRB accompanied by ED Rn on monitor in stable condition. Moved into ICU bed. ICU monitors applied. Report received from ED RN, Dayana Le. Patient placed back on BiPAP. RT called to room. 1825: Admission assessment completed, see flow sheets. RT at the bedside putting patient on Airflow + a NRB. VSS. Preventative sacral and heel boarders placed, blanchable redness. SCDs placed. Patient alert and oriented x4, able to follow simple commands. VSS. Breath sounds diminished on ascultation. Dyspnea at rest or with any slight exertion. Active bowel sounds +4. All pulses palpable. Pitting edema noted to BLE. Other generalized edema also noted. Scattered bruising. Patient denies any needs or pain. 1917: On call critical care, Dr. Lisbeth Eisenmenger, messaged regarding patient. Updated on reason for admission and current clinical status. See new orders per Dr. Urvashi Rios. 1920: Handoff/report completed with Jenn night shift RN. Patient in bed, VSS on airflow and NRB. Denies any needs at this time.      Electronically signed by Yue Ocampo RN on 10/18/2020 at 7:46 PM